# Patient Record
Sex: FEMALE | Race: ASIAN | Employment: UNEMPLOYED | URBAN - METROPOLITAN AREA
[De-identification: names, ages, dates, MRNs, and addresses within clinical notes are randomized per-mention and may not be internally consistent; named-entity substitution may affect disease eponyms.]

---

## 2017-02-13 ENCOUNTER — OFFICE VISIT (OUTPATIENT)
Dept: INTERNAL MEDICINE CLINIC | Facility: CLINIC | Age: 47
End: 2017-02-13

## 2017-02-13 VITALS
HEIGHT: 64 IN | DIASTOLIC BLOOD PRESSURE: 60 MMHG | BODY MASS INDEX: 21.68 KG/M2 | HEART RATE: 82 BPM | WEIGHT: 127 LBS | RESPIRATION RATE: 12 BRPM | OXYGEN SATURATION: 99 % | SYSTOLIC BLOOD PRESSURE: 110 MMHG

## 2017-02-13 DIAGNOSIS — I73.00 RAYNAUD'S PHENOMENON WITHOUT GANGRENE: ICD-10-CM

## 2017-02-13 DIAGNOSIS — R68.89 SENSATION OF FEELING COLD: ICD-10-CM

## 2017-02-13 DIAGNOSIS — S76.312A HAMSTRING STRAIN, LEFT, INITIAL ENCOUNTER: Primary | ICD-10-CM

## 2017-02-13 DIAGNOSIS — R68.83 SHIVERS: ICD-10-CM

## 2017-02-13 PROCEDURE — 99214 OFFICE O/P EST MOD 30 MIN: CPT | Performed by: INTERNAL MEDICINE

## 2017-02-13 NOTE — PATIENT INSTRUCTIONS
Supine Hamstring Stretch    1. Lie on the floor on your back, with both knees bent and your feet flat on the floor. Put a towel around the back of your right thigh. 2. Tighten your stomach muscles.  Slowly pull on the towel to pull your right leg toward

## 2017-02-13 NOTE — PROGRESS NOTES
Mari Stewart is a 42year old female.   HPI:   CC: shiver and cold sensation all over body ,left thigh pain for two months  Heavy periods monthly last 4 days with clots  Period came 7 days early this last time  Overstretched yoga 2 months ago when she noted josué LUNGS: clear to auscultation,   GI: Soft+BS NT ND,no masses, no HSM, no abdominal bruit , no hernia,   MUSCULOSKELETAL:  no cyanosis, clubbing or edema b/l, 2+DP/PT pulse bilateral, minimal trapezius knotted  No spinal or paraspinal tenderness  Neg SLR tania 7. Reach toward your ankle. Keep your knee, neck, and back straight. Feel the stretch in the back of your thigh. 8. Hold for 30 to 60 seconds. Repeat 2 times, or as instructed. 9. Switch legs and repeat.   10. Repeat this exercise 3 times per day, or as i

## 2017-02-15 LAB
ABSOLUTE BASOPHILS: 29 CELLS/UL (ref 0–200)
ABSOLUTE EOSINOPHILS: 29 CELLS/UL (ref 15–500)
ABSOLUTE LYMPHOCYTES: 1267 CELLS/UL (ref 850–3900)
ABSOLUTE MONOCYTES: 287 CELLS/UL (ref 200–950)
ABSOLUTE NEUTROPHILS: 2489 CELLS/UL (ref 1500–7800)
BASOPHILS: 0.7 %
EOSINOPHILS: 0.7 %
FERRITIN: 43 NG/ML (ref 10–232)
HEMATOCRIT: 35.9 % (ref 35–45)
HEMOGLOBIN: 12.1 G/DL (ref 11.7–15.5)
LYMPHOCYTES: 30.9 %
MCH: 31.8 PG (ref 27–33)
MCHC: 33.7 G/DL (ref 32–36)
MCV: 94.4 FL (ref 80–100)
MONOCYTES: 7 %
MPV: 9.4 FL (ref 7.5–12.5)
NEUTROPHILS: 60.7 %
PLATELET COUNT: 195 THOUSAND/UL (ref 140–400)
RDW: 13 % (ref 11–15)
RED BLOOD CELL COUNT: 3.81 MILLION/UL (ref 3.8–5.1)
TSH W/REFLEX TO FT4: 0.48 MIU/L
WHITE BLOOD CELL COUNT: 4.1 THOUSAND/UL (ref 3.8–10.8)

## 2017-06-21 ENCOUNTER — OFFICE VISIT (OUTPATIENT)
Dept: INTERNAL MEDICINE CLINIC | Facility: CLINIC | Age: 47
End: 2017-06-21

## 2017-06-21 VITALS
DIASTOLIC BLOOD PRESSURE: 60 MMHG | HEIGHT: 64 IN | WEIGHT: 122 LBS | BODY MASS INDEX: 20.83 KG/M2 | RESPIRATION RATE: 12 BRPM | SYSTOLIC BLOOD PRESSURE: 100 MMHG | TEMPERATURE: 98 F | HEART RATE: 70 BPM | OXYGEN SATURATION: 100 %

## 2017-06-21 DIAGNOSIS — Z01.419 PAP SMEAR, AS PART OF ROUTINE GYNECOLOGICAL EXAMINATION: ICD-10-CM

## 2017-06-21 DIAGNOSIS — Z00.00 ROUTINE PHYSICAL EXAMINATION: Primary | ICD-10-CM

## 2017-06-21 DIAGNOSIS — Z12.31 VISIT FOR SCREENING MAMMOGRAM: ICD-10-CM

## 2017-06-21 DIAGNOSIS — M25.561 CHRONIC PAIN OF RIGHT KNEE: ICD-10-CM

## 2017-06-21 DIAGNOSIS — G89.29 CHRONIC PAIN OF RIGHT KNEE: ICD-10-CM

## 2017-06-21 PROCEDURE — 87624 HPV HI-RISK TYP POOLED RSLT: CPT | Performed by: INTERNAL MEDICINE

## 2017-06-21 PROCEDURE — 99396 PREV VISIT EST AGE 40-64: CPT | Performed by: INTERNAL MEDICINE

## 2017-06-21 PROCEDURE — 88175 CYTOPATH C/V AUTO FLUID REDO: CPT | Performed by: INTERNAL MEDICINE

## 2017-06-21 NOTE — PROGRESS NOTES
HPI:   Mateus Nova is a 42year old female who presents for a complete physical exam.    Wt Readings from Last 6 Encounters:  06/21/17 : 122 lb  02/13/17 : 127 lb  10/03/16 : 120 lb  06/22/16 : 122 lb  07/28/15 : 122 lb  10/23/14 : 122 lb    Body mass index is pain or ST  LUNGS: denies shortness of breath with exertion  CARDIOVASCULAR: denies chest pain on exertion, no heart racing  GI: denies abdominal pain,denies heartburn  : denies dysuria  GYN- LMP June 3rd lighter flow  MUSCULOSKELETAL: denies back pain, Order put in for mammogram . Self breast exam explained. Health maintenance, will check fasting Lipids, CMP, and TSH. Pt' s weight is Body mass index is 20.93 kg/(m^2). , recommended low fat diet and aerobic exercise 30 minutes three times weekly.   The p

## 2017-07-14 PROBLEM — M17.11 PATELLOFEMORAL ARTHRITIS OF RIGHT KNEE: Status: ACTIVE | Noted: 2017-07-14

## 2017-08-25 ENCOUNTER — HOSPITAL ENCOUNTER (OUTPATIENT)
Dept: MAMMOGRAPHY | Age: 47
Discharge: HOME | End: 2017-08-25
Attending: INTERNAL MEDICINE
Payer: COMMERCIAL

## 2017-08-25 DIAGNOSIS — Z12.31 VISIT FOR SCREENING MAMMOGRAM: ICD-10-CM

## 2017-08-25 PROCEDURE — 77067 SCR MAMMO BI INCL CAD: CPT | Performed by: INTERNAL MEDICINE

## 2018-08-22 ENCOUNTER — TELEPHONE (OUTPATIENT)
Dept: INTERNAL MEDICINE CLINIC | Facility: CLINIC | Age: 48
End: 2018-08-22

## 2018-08-22 NOTE — TELEPHONE ENCOUNTER
Called pt, spoke to her . She is overdue for physical and pap. Offered to make ov with Dr Yamilet Young. He is going to research and call back and let us know.

## 2018-09-19 ENCOUNTER — OFFICE VISIT (OUTPATIENT)
Dept: INTERNAL MEDICINE CLINIC | Facility: CLINIC | Age: 48
End: 2018-09-19
Payer: COMMERCIAL

## 2018-09-19 VITALS
WEIGHT: 121 LBS | TEMPERATURE: 98 F | HEIGHT: 63.5 IN | BODY MASS INDEX: 21.17 KG/M2 | HEART RATE: 75 BPM | OXYGEN SATURATION: 98 % | RESPIRATION RATE: 16 BRPM | SYSTOLIC BLOOD PRESSURE: 98 MMHG | DIASTOLIC BLOOD PRESSURE: 60 MMHG

## 2018-09-19 DIAGNOSIS — Z23 NEED FOR VACCINATION: ICD-10-CM

## 2018-09-19 DIAGNOSIS — Z13.220 SCREENING FOR LIPOID DISORDERS: ICD-10-CM

## 2018-09-19 DIAGNOSIS — E55.9 VITAMIN D DEFICIENCY: ICD-10-CM

## 2018-09-19 DIAGNOSIS — Z12.39 BREAST CANCER SCREENING: ICD-10-CM

## 2018-09-19 DIAGNOSIS — Z13.29 SCREENING FOR THYROID DISORDER: ICD-10-CM

## 2018-09-19 DIAGNOSIS — Z13.1 SCREENING FOR DIABETES MELLITUS: ICD-10-CM

## 2018-09-19 DIAGNOSIS — Z00.00 ENCOUNTER FOR ANNUAL PHYSICAL EXAM: Primary | ICD-10-CM

## 2018-09-19 DIAGNOSIS — Z12.4 CERVICAL CANCER SCREENING: ICD-10-CM

## 2018-09-19 DIAGNOSIS — Z13.89 SCREENING FOR GENITOURINARY CONDITION: ICD-10-CM

## 2018-09-19 DIAGNOSIS — Z13.228 SCREENING FOR METABOLIC DISORDER: ICD-10-CM

## 2018-09-19 DIAGNOSIS — Z13.0 SCREENING FOR IRON DEFICIENCY ANEMIA: ICD-10-CM

## 2018-09-19 PROCEDURE — 99396 PREV VISIT EST AGE 40-64: CPT | Performed by: STUDENT IN AN ORGANIZED HEALTH CARE EDUCATION/TRAINING PROGRAM

## 2018-09-19 PROCEDURE — 90686 IIV4 VACC NO PRSV 0.5 ML IM: CPT | Performed by: STUDENT IN AN ORGANIZED HEALTH CARE EDUCATION/TRAINING PROGRAM

## 2018-09-19 PROCEDURE — 87624 HPV HI-RISK TYP POOLED RSLT: CPT | Performed by: STUDENT IN AN ORGANIZED HEALTH CARE EDUCATION/TRAINING PROGRAM

## 2018-09-19 PROCEDURE — 90471 IMMUNIZATION ADMIN: CPT | Performed by: STUDENT IN AN ORGANIZED HEALTH CARE EDUCATION/TRAINING PROGRAM

## 2018-09-19 NOTE — PROGRESS NOTES
[facility]    REASON FOR VISIT:    Juan Miguel Tirado is a 57year old female who presents for an [address]. Current Complaints: has intermittent joint pain, did not try anything for her symptoms. Otherwise feeling well.   Vaccinations: Elson Gosselin or other  risk factors No results found for: A1C  GLUCOSE (mg/dL)   Date Value   06/28/2017 92         Gonorrhea Screening if high risk No results found for: GONOCOCCUS    HIV Screening For all adults age 22-65, older adults at increased risk No results fo Genitourinary: Negative for urgency, frequency and difficulty urinating. Musculoskeletal: Positive for arthralgias, negative for gait problem. Skin: Negative for color change and rash. Neurological: Negative for tremors, weakness and numbness.    He Lymphadenopathy: She has no cervical adenopathy or supraclavicular adenopathy. Neurological: She is alert and oriented to person, place, and time. She has normal reflexes. Skin: Skin is warm. No rash noted. No erythema.    Psychiatric: She has a torres PLATELET    Screening for thyroid disorder  -     TSH W REFLEX TO FREE T4    Screening for diabetes mellitus  -     HEMOGLOBIN A1C    Cervical cancer screening  -     THINPREP PAP WITH HPV REFLEX REQUEST B; Future  -     THINPREP PAP WITH HPV REFLEX REQUES dose   Varicella 2 doses if not immune   MMR 1-2 doses if born after 1954 and not Afshan Willis MD  9/19/2018  4:14 PM

## 2018-09-19 NOTE — PATIENT INSTRUCTIONS
Prevention Guidelines, Women Ages 36 to 52  Screening tests and vaccines are an important part of managing your health. A screening test is done to find possible disorders or diseases in people who don't have any symptoms.  The goal is to find a disease e Depression All women in this age group At routine exams   Gonorrhea Sexually active women at increased risk for infection At routine exams   Hepatitis C Anyone at increased risk; 1 time for those born between [facility] At routine exams   High cholester Meningococcal Women at increased risk for infection–talk with your healthcare provider 1 or more doses   Pneumococcal conjugate vaccine (PCV13) and pneumococcal polysaccharide vaccine (PPSV23) Women at increased risk for infection–talk with your healthcare

## 2018-09-23 LAB — HPV I/H RISK 1 DNA SPEC QL NAA+PROBE: NEGATIVE

## 2018-09-26 LAB
ABSOLUTE BASOPHILS: 38 CELLS/UL (ref 0–200)
ABSOLUTE EOSINOPHILS: 22 CELLS/UL (ref 15–500)
ABSOLUTE LYMPHOCYTES: 1598 CELLS/UL (ref 850–3900)
ABSOLUTE MONOCYTES: 297 CELLS/UL (ref 200–950)
ABSOLUTE NEUTROPHILS: 3445 CELLS/UL (ref 1500–7800)
ALBUMIN/GLOBULIN RATIO: 1.8 (CALC) (ref 1–2.5)
ALBUMIN: 4.4 G/DL (ref 3.6–5.1)
ALKALINE PHOSPHATASE: 35 U/L (ref 33–115)
ALT: 12 U/L (ref 6–29)
APPEARANCE: CLEAR
AST: 14 U/L (ref 10–35)
BASOPHILS: 0.7 %
BILIRUBIN, TOTAL: 0.9 MG/DL (ref 0.2–1.2)
BILIRUBIN: NEGATIVE
BUN: 7 MG/DL (ref 7–25)
CALCIUM: 9 MG/DL (ref 8.6–10.2)
CARBON DIOXIDE: 26 MMOL/L (ref 20–32)
CHLORIDE: 104 MMOL/L (ref 98–110)
CHOL/HDLC RATIO: 3 (CALC)
CHOLESTEROL, TOTAL: 169 MG/DL
COLOR: YELLOW
CREATININE: 0.7 MG/DL (ref 0.5–1.1)
EGFR IF AFRICN AM: 120 ML/MIN/1.73M2
EGFR IF NONAFRICN AM: 104 ML/MIN/1.73M2
EOSINOPHILS: 0.4 %
GLOBULIN: 2.5 G/DL (CALC) (ref 1.9–3.7)
GLUCOSE: 84 MG/DL (ref 65–99)
GLUCOSE: NEGATIVE
HDL CHOLESTEROL: 57 MG/DL
HEMATOCRIT: 35.4 % (ref 35–45)
HEMOGLOBIN A1C: 5.1 % OF TOTAL HGB
HEMOGLOBIN: 11.8 G/DL (ref 11.7–15.5)
KETONES: NEGATIVE
LDL-CHOLESTEROL: 94 MG/DL (CALC)
LYMPHOCYTES: 29.6 %
MCH: 30.6 PG (ref 27–33)
MCHC: 33.3 G/DL (ref 32–36)
MCV: 91.9 FL (ref 80–100)
MONOCYTES: 5.5 %
MPV: 10.9 FL (ref 7.5–12.5)
NEUTROPHILS: 63.8 %
NITRITE: NEGATIVE
NON-HDL CHOLESTEROL: 112 MG/DL (CALC)
OCCULT BLOOD: NEGATIVE
PH: 7.5 (ref 5–8)
PLATELET COUNT: 218 THOUSAND/UL (ref 140–400)
POTASSIUM: 3.9 MMOL/L (ref 3.5–5.3)
PROTEIN, TOTAL: 6.9 G/DL (ref 6.1–8.1)
PROTEIN: NEGATIVE
RDW: 12 % (ref 11–15)
RED BLOOD CELL COUNT: 3.85 MILLION/UL (ref 3.8–5.1)
SODIUM: 136 MMOL/L (ref 135–146)
SPECIFIC GRAVITY: 1.01 (ref 1–1.03)
TRIGLYCERIDES: 90 MG/DL
TSH W/REFLEX TO FT4: 0.85 MIU/L
VITAMIN D, 25-OH, TOTAL: 35 NG/ML (ref 30–100)
WHITE BLOOD CELL COUNT: 5.4 THOUSAND/UL (ref 3.8–10.8)

## 2019-04-09 ENCOUNTER — HOSPITAL ENCOUNTER (OUTPATIENT)
Age: 49
Discharge: HOME | End: 2019-04-09
Attending: FAMILY MEDICINE
Payer: COMMERCIAL

## 2019-04-09 ENCOUNTER — TELEPHONE (OUTPATIENT)
Dept: INTERNAL MEDICINE CLINIC | Facility: CLINIC | Age: 49
End: 2019-04-09

## 2019-04-09 ENCOUNTER — APPOINTMENT (OUTPATIENT)
Dept: GENERAL RADIOLOGY | Age: 49
End: 2019-04-09
Attending: FAMILY MEDICINE
Payer: COMMERCIAL

## 2019-04-09 VITALS
SYSTOLIC BLOOD PRESSURE: 104 MMHG | OXYGEN SATURATION: 99 % | TEMPERATURE: 98 F | BODY MASS INDEX: 20.49 KG/M2 | DIASTOLIC BLOOD PRESSURE: 69 MMHG | HEIGHT: 64 IN | HEART RATE: 58 BPM | WEIGHT: 120 LBS | RESPIRATION RATE: 18 BRPM

## 2019-04-09 DIAGNOSIS — J39.2 THROAT IRRITATION: Primary | ICD-10-CM

## 2019-04-09 PROCEDURE — 70360 X-RAY EXAM OF NECK: CPT | Performed by: FAMILY MEDICINE

## 2019-04-09 PROCEDURE — 99213 OFFICE O/P EST LOW 20 MIN: CPT

## 2019-04-09 PROCEDURE — 99204 OFFICE O/P NEW MOD 45 MIN: CPT

## 2019-04-09 NOTE — ED INITIAL ASSESSMENT (HPI)
The patient states she has had a discomfort to her throat/esophagus x 4 months. She states the discomfort is intermittent and started at random.   She feels better when she drinks something but the discomfort gets worse if she wears a shirt with a high col

## 2019-04-09 NOTE — TELEPHONE ENCOUNTER
Patients  calling stating that for the last couple of months the patient has felt like she has something stuck in her throat.  is translating for wife. Patient states that it does not usually affect her eating, drinking or breathing.  Patient

## 2019-04-09 NOTE — ED PROVIDER NOTES
Patient Seen in: [facility]    History   Patient presents with:  Sore Throat    Stated Complaint: check throat, x3-4months    HPI    61-year-old female with a history of chronic sinusitis/allergic rhinitis presents the IC s and vital signs reviewed. All other systems reviewed and negative except as noted above.     Physical Exam     ED Triage Vitals [04/09/19 1540]   /56   Pulse 68   Resp 18   Temp 98.4 °F (36.9 °C)   Temp src Temporal   SpO2 97 %   O2 Device None ( lidocaine for symptom medic relief until she can follow-up with her PCP for further evaluation and treatment.             Disposition and Plan     Clinical Impression:  Throat irritation  (primary encounter diagnosis)    Disposition:  Discharge  4/9/2019  5

## 2019-04-10 ENCOUNTER — TELEPHONE (OUTPATIENT)
Dept: INTERNAL MEDICINE CLINIC | Facility: CLINIC | Age: 49
End: 2019-04-10

## 2019-04-10 NOTE — TELEPHONE ENCOUNTER
Pt went to UC yesterday for worsening chronic throat irritation. Would you prefer she follow up with you or see ENT first?    MDM   73-year-old female with 4 months of throat irritation. Patient has no dysphasia. No signs of any thyroid etiology.   Soft t

## 2019-04-25 ENCOUNTER — MED REC SCAN ONLY (OUTPATIENT)
Dept: INTERNAL MEDICINE CLINIC | Facility: CLINIC | Age: 49
End: 2019-04-25

## 2021-03-28 ENCOUNTER — IMMUNIZATION (OUTPATIENT)
Dept: LAB | Age: 51
End: 2021-03-28

## 2021-03-28 DIAGNOSIS — Z23 NEED FOR VACCINATION: Primary | ICD-10-CM

## 2021-03-28 PROCEDURE — 91300 COVID 19 PFIZER-BIONTECH: CPT

## 2021-03-28 PROCEDURE — 0001A COVID 19 PFIZER-BIONTECH: CPT

## 2021-04-18 ENCOUNTER — IMMUNIZATION (OUTPATIENT)
Dept: LAB | Age: 51
End: 2021-04-18
Attending: HOSPITALIST

## 2021-04-18 DIAGNOSIS — Z23 NEED FOR VACCINATION: Primary | ICD-10-CM

## 2021-04-18 PROCEDURE — 0002A COVID 19 PFIZER-BIONTECH: CPT

## 2021-04-18 PROCEDURE — 91300 COVID 19 PFIZER-BIONTECH: CPT

## 2022-08-08 ENCOUNTER — TELEPHONE (OUTPATIENT)
Dept: FAMILY MEDICINE CLINIC | Facility: CLINIC | Age: 52
End: 2022-08-08

## 2022-08-08 NOTE — TELEPHONE ENCOUNTER
Pt with complaints of right lower arm rash that started 08/06/22. At first it appeared as a mosquito bite and now is  Spreading  States she notes 8-9 red,swollen spots and one with clear discharge. States they are very itchy. Pt also with complaints of itchy throat, denies cough or fever.      Pt to establish care for exam of this rash, please advice if ok to schedule virtual apt or if pt should be referred to [facility]

## 2022-08-09 ENCOUNTER — HOSPITAL ENCOUNTER (OUTPATIENT)
Age: 52
Discharge: HOME | End: 2022-08-09
Payer: COMMERCIAL

## 2022-08-09 VITALS
HEART RATE: 89 BPM | RESPIRATION RATE: 20 BRPM | WEIGHT: 120 LBS | BODY MASS INDEX: 20.49 KG/M2 | SYSTOLIC BLOOD PRESSURE: 113 MMHG | TEMPERATURE: 99 F | HEIGHT: 64 IN | DIASTOLIC BLOOD PRESSURE: 73 MMHG | OXYGEN SATURATION: 97 %

## 2022-08-09 DIAGNOSIS — R21 RASH: Primary | ICD-10-CM

## 2022-08-09 DIAGNOSIS — U07.1 COVID-19 VIRUS INFECTION: ICD-10-CM

## 2022-08-09 LAB — SARS-COV-2 RNA RESP QL NAA+PROBE: DETECTED

## 2022-08-09 RX ORDER — TRIAMCINOLONE ACETONIDE 1 MG/G
CREAM TOPICAL 2 TIMES DAILY
Qty: 1 EACH | Refills: 0 | Status: SHIPPED | OUTPATIENT
Start: 2022-08-09 | End: 2022-08-19

## 2022-08-09 RX ORDER — NIRMATRELVIR AND RITONAVIR 300-100 MG
KIT ORAL
Qty: 30 TABLET | Refills: 0 | Status: SHIPPED | OUTPATIENT
Start: 2022-08-09 | End: 2022-08-14

## 2022-08-09 NOTE — ED INITIAL ASSESSMENT (HPI)
Pt has had a cough , headache and a rash to her arms and legs that is very itchy for the past 3 days  [place] and  also has a rash.   At home covid test neg

## 2022-08-10 ENCOUNTER — TELEMEDICINE (OUTPATIENT)
Dept: FAMILY MEDICINE CLINIC | Facility: CLINIC | Age: 52
End: 2022-08-10
Payer: COMMERCIAL

## 2022-08-10 DIAGNOSIS — U07.1 COVID-19 VIRUS INFECTION: Primary | ICD-10-CM

## 2022-08-10 DIAGNOSIS — R21 RASH AND NONSPECIFIC SKIN ERUPTION: ICD-10-CM

## 2022-08-10 PROCEDURE — 99203 OFFICE O/P NEW LOW 30 MIN: CPT | Performed by: FAMILY MEDICINE

## 2022-08-12 LAB
MONKEYPOX PCR: NOT DETECTED
MONKEYPOX PCR: NOT DETECTED

## 2023-02-10 ENCOUNTER — OFFICE VISIT (OUTPATIENT)
Dept: FAMILY MEDICINE CLINIC | Facility: CLINIC | Age: 53
End: 2023-02-10
Payer: COMMERCIAL

## 2023-02-10 VITALS
BODY MASS INDEX: 21.46 KG/M2 | TEMPERATURE: 98 F | SYSTOLIC BLOOD PRESSURE: 108 MMHG | RESPIRATION RATE: 18 BRPM | HEART RATE: 83 BPM | HEIGHT: 63.5 IN | DIASTOLIC BLOOD PRESSURE: 62 MMHG | WEIGHT: 122.63 LBS | OXYGEN SATURATION: 99 %

## 2023-02-10 DIAGNOSIS — Z12.31 ENCOUNTER FOR MAMMOGRAM TO ESTABLISH BASELINE MAMMOGRAM: ICD-10-CM

## 2023-02-10 DIAGNOSIS — Z13.0 SCREENING FOR ENDOCRINE, NUTRITIONAL, METABOLIC AND IMMUNITY DISORDER: ICD-10-CM

## 2023-02-10 DIAGNOSIS — L71.0 PERIORAL DERMATITIS: ICD-10-CM

## 2023-02-10 DIAGNOSIS — Z13.6 SCREENING FOR ISCHEMIC HEART DISEASE: ICD-10-CM

## 2023-02-10 DIAGNOSIS — Z12.4 SCREENING FOR CERVICAL CANCER: ICD-10-CM

## 2023-02-10 DIAGNOSIS — Z12.11 COLON CANCER SCREENING: ICD-10-CM

## 2023-02-10 DIAGNOSIS — Z13.21 SCREENING FOR ENDOCRINE, NUTRITIONAL, METABOLIC AND IMMUNITY DISORDER: ICD-10-CM

## 2023-02-10 DIAGNOSIS — Z23 NEED FOR VACCINATION: ICD-10-CM

## 2023-02-10 DIAGNOSIS — Z13.228 SCREENING FOR ENDOCRINE, NUTRITIONAL, METABOLIC AND IMMUNITY DISORDER: ICD-10-CM

## 2023-02-10 DIAGNOSIS — Z00.00 ANNUAL PHYSICAL EXAM: Primary | ICD-10-CM

## 2023-02-10 DIAGNOSIS — Z13.29 SCREENING FOR ENDOCRINE, NUTRITIONAL, METABOLIC AND IMMUNITY DISORDER: ICD-10-CM

## 2023-02-10 PROCEDURE — 3008F BODY MASS INDEX DOCD: CPT | Performed by: FAMILY MEDICINE

## 2023-02-10 PROCEDURE — 99396 PREV VISIT EST AGE 40-64: CPT | Performed by: FAMILY MEDICINE

## 2023-02-10 PROCEDURE — 88175 CYTOPATH C/V AUTO FLUID REDO: CPT | Performed by: FAMILY MEDICINE

## 2023-02-10 PROCEDURE — 3078F DIAST BP <80 MM HG: CPT | Performed by: FAMILY MEDICINE

## 2023-02-10 PROCEDURE — 90471 IMMUNIZATION ADMIN: CPT | Performed by: FAMILY MEDICINE

## 2023-02-10 PROCEDURE — 87624 HPV HI-RISK TYP POOLED RSLT: CPT | Performed by: FAMILY MEDICINE

## 2023-02-10 PROCEDURE — 90472 IMMUNIZATION ADMIN EACH ADD: CPT | Performed by: FAMILY MEDICINE

## 2023-02-10 PROCEDURE — 90715 TDAP VACCINE 7 YRS/> IM: CPT | Performed by: FAMILY MEDICINE

## 2023-02-10 PROCEDURE — 90750 HZV VACC RECOMBINANT IM: CPT | Performed by: FAMILY MEDICINE

## 2023-02-10 PROCEDURE — 3074F SYST BP LT 130 MM HG: CPT | Performed by: FAMILY MEDICINE

## 2023-02-13 LAB — HPV I/H RISK 1 DNA SPEC QL NAA+PROBE: NEGATIVE

## 2023-02-15 PROCEDURE — 82274 ASSAY TEST FOR BLOOD FECAL: CPT | Performed by: FAMILY MEDICINE

## 2023-02-17 ENCOUNTER — TELEPHONE (OUTPATIENT)
Dept: FAMILY MEDICINE CLINIC | Facility: CLINIC | Age: 53
End: 2023-02-17

## 2023-02-17 DIAGNOSIS — L71.0 PERIORAL DERMATITIS: Primary | ICD-10-CM

## 2023-02-17 NOTE — TELEPHONE ENCOUNTER
Patients [facility] called requesting Dermatology referral due to patient having worsening rash around mouth. Requested Mario Emerson MD, FAAD with [facility]. Referral pended for review.

## 2023-02-17 NOTE — TELEPHONE ENCOUNTER
Pt's  informed that referral has been signed, but it is still open in the system. Asked  to call back on Monday if he is unable to see the referral in the chart and we can reach out to the referral department.

## 2023-02-22 LAB
ABSOLUTE BASOPHILS: 62 CELLS/UL (ref 0–200)
ABSOLUTE EOSINOPHILS: 149 CELLS/UL (ref 15–500)
ABSOLUTE LYMPHOCYTES: 1445 CELLS/UL (ref 850–3900)
ABSOLUTE MONOCYTES: 397 CELLS/UL (ref 200–950)
ABSOLUTE NEUTROPHILS: 4148 CELLS/UL (ref 1500–7800)
ALBUMIN/GLOBULIN RATIO: 2.2 (CALC) (ref 1–2.5)
ALBUMIN: 4.3 G/DL (ref 3.6–5.1)
ALKALINE PHOSPHATASE: 31 U/L (ref 37–153)
ALT: 12 U/L (ref 6–29)
AST: 12 U/L (ref 10–35)
BASOPHILS: 1 %
BILIRUBIN, TOTAL: 0.6 MG/DL (ref 0.2–1.2)
BUN: 10 MG/DL (ref 7–25)
CALCIUM: 8.9 MG/DL (ref 8.6–10.4)
CARBON DIOXIDE: 27 MMOL/L (ref 20–32)
CHLORIDE: 106 MMOL/L (ref 98–110)
CHOL/HDLC RATIO: 3 (CALC)
CHOLESTEROL, TOTAL: 192 MG/DL
CREATININE: 0.68 MG/DL (ref 0.5–1.03)
EGFR: 106 ML/MIN/1.73M2
EOSINOPHILS: 2.4 %
GLOBULIN: 2 G/DL (CALC) (ref 1.9–3.7)
GLUCOSE: 85 MG/DL (ref 65–99)
HDL CHOLESTEROL: 65 MG/DL
HEMATOCRIT: 36.4 % (ref 35–45)
HEMOGLOBIN: 12.1 G/DL (ref 11.7–15.5)
LDL-CHOLESTEROL: 110 MG/DL (CALC)
LYMPHOCYTES: 23.3 %
MCH: 31.6 PG (ref 27–33)
MCHC: 33.2 G/DL (ref 32–36)
MCV: 95 FL (ref 80–100)
MONOCYTES: 6.4 %
MPV: 10.7 FL (ref 7.5–12.5)
NEUTROPHILS: 66.9 %
NON-HDL CHOLESTEROL: 127 MG/DL (CALC)
PLATELET COUNT: 237 THOUSAND/UL (ref 140–400)
POTASSIUM: 4.1 MMOL/L (ref 3.5–5.3)
PROTEIN, TOTAL: 6.3 G/DL (ref 6.1–8.1)
RDW: 11.8 % (ref 11–15)
RED BLOOD CELL COUNT: 3.83 MILLION/UL (ref 3.8–5.1)
SODIUM: 138 MMOL/L (ref 135–146)
TRIGLYCERIDES: 76 MG/DL
TSH W/REFLEX TO FT4: 1.33 MIU/L
WHITE BLOOD CELL COUNT: 6.2 THOUSAND/UL (ref 3.8–10.8)

## 2023-02-27 LAB — HEMOCCULT STL QL: NEGATIVE

## 2023-04-11 ENCOUNTER — NURSE ONLY (OUTPATIENT)
Dept: FAMILY MEDICINE CLINIC | Facility: CLINIC | Age: 53
End: 2023-04-11
Payer: COMMERCIAL

## 2023-04-11 DIAGNOSIS — Z23 NEED FOR VACCINATION: Primary | ICD-10-CM

## 2023-04-11 PROCEDURE — 90750 HZV VACC RECOMBINANT IM: CPT | Performed by: FAMILY MEDICINE

## 2023-04-11 PROCEDURE — 90471 IMMUNIZATION ADMIN: CPT | Performed by: FAMILY MEDICINE

## 2023-04-11 RX ORDER — DOXYCYCLINE 100 MG/1
CAPSULE ORAL
COMMUNITY
Start: 2023-03-08

## 2023-04-11 RX ORDER — SULFACETAMIDE SODIUM, SULFUR 98; 48 MG/G; MG/G
CREAM TOPICAL
COMMUNITY
Start: 2023-03-08

## 2023-07-13 ENCOUNTER — PATIENT MESSAGE (OUTPATIENT)
Dept: FAMILY MEDICINE CLINIC | Facility: CLINIC | Age: 53
End: 2023-07-13

## 2023-07-13 DIAGNOSIS — D05.11 DUCTAL CARCINOMA IN SITU (DCIS) OF RIGHT BREAST: Primary | ICD-10-CM

## 2023-07-13 NOTE — TELEPHONE ENCOUNTER
Spouse called, wanted to know if we had received pt My Chart Message. Also wants to know if Dr. Charly Daugherty will be referring pt to oncologist or breast cancer specialist. Was informed that we would give him a call back when referral was signed. Pt v/u.

## 2023-07-13 NOTE — TELEPHONE ENCOUNTER
From: Mary Cordova  To: Kelvin Mtz MD  Sent: 7/13/2023 8:58 AM CDT  Subject: Referral request for an oncologist    Dr. Kelvin Mtz,     I am diagnosed with Invasive Ductal Carcinoma (IDC) in a [facility] facility in my right breast. The tumor was surgically removed along with 3 nearby lymph nodes on July 5, 2023 and biopsy was performed. Please refer to the attached pathology reports (in [facility]). I am returning to the [address] [facility] on July 24 but would like to contact an oncologist immediately so I can visit her/him once I am back to the [address] [facility]. My , Danny Grissom, is in the [address] [facility]. Please release any information/messages to him. His cell # is [phone] or [email or web address]. Besides, my  will call you/your office later today or tomorrow morning regarding this diagnosis. Your urgent attention is greatly appreciated.     Mary Cordova

## 2023-07-13 NOTE — TELEPHONE ENCOUNTER
Please see My Chart Message and review. Pathology reports printed and placed in provider's bin for review. Oncology referral pended for provider review and signature.

## 2023-07-14 ENCOUNTER — NURSE NAVIGATOR ENCOUNTER (OUTPATIENT)
Dept: HEMATOLOGY/ONCOLOGY | Facility: HOSPITAL | Age: 53
End: 2023-07-14

## 2023-07-14 NOTE — PROGRESS NOTES
Called patient's  back in regards to his VM about helping patient receive a medical oncologist appointment after her breast conservation surgery she had in [place] on July 5, 2023 with results of invasive ductal carcinoma with ER +, Her-2(-), Ki-67 10%, and patient's  stated that patient's tumor was about 7 x 6 mm. I introduced myself as one of the breast nurse navigators and stated that we will need patient's breast images for the last 5 years on a disc and her medical records leading to her breast cancer diagnosis. Placed on hold for an appointment for Dr. Orlando Joshi for Tuesday July 25, 2023 in [place] for patient and stated that I will contact him next in regards to him obtaining the disc and records. He stated he will do his best since most of the records are in [place]. He states his wife returns from [place] on Monday July 24, 2023. He thanked me for the phone call back and assistance. Pt was provided with the breast nurse navigators contact information and was encouraged to phone with any other questions or concerns.

## 2023-07-17 ENCOUNTER — NURSE NAVIGATOR ENCOUNTER (OUTPATIENT)
Dept: HEMATOLOGY/ONCOLOGY | Facility: HOSPITAL | Age: 53
End: 2023-07-17

## 2023-07-17 NOTE — PROGRESS NOTES
LMOVMTBENNETT in regards to his message from [place] to call him. Awaiting phone call back from patient's .

## 2023-07-18 ENCOUNTER — NURSE NAVIGATOR ENCOUNTER (OUTPATIENT)
Dept: HEMATOLOGY/ONCOLOGY | Facility: HOSPITAL | Age: 53
End: 2023-07-18

## 2023-07-18 ENCOUNTER — TELEPHONE (OUTPATIENT)
Dept: FAMILY MEDICINE CLINIC | Facility: CLINIC | Age: 53
End: 2023-07-18

## 2023-07-18 NOTE — TELEPHONE ENCOUNTER
Pt's  informed that both referrals has been authorized and he should call the doctor's office to make sure that they can see the referrals, he v/u.

## 2023-07-18 NOTE — PROGRESS NOTES
Called patient's spouse, Ophelia Duncan, and stated to him that we received patient's breast images from [facility]. I stated to him that I saw the images he sent to the patient's PCP via [employer] but I still need the images on the disc. He stated he can drop off the images on Thursday or Friday afternoon. I also asked him if he was able to ask his wife in [place] if she is able to obtain her surgical pathology slides/blocks from her lumpectomy and he stated he will ask her. I stated to him that his wife's referral for Dr. Angela Schreiber was authorized and scheduled his wife for Tuesday July 25, 2023 at 1000 at the [facility]. He thanked me for the phone call and assistance. Pt was provided with the breast nurse navigators contact information and was encouraged to phone with any other questions or concerns.

## 2023-07-18 NOTE — TELEPHONE ENCOUNTER
Pt's  called asking for update on referrals that were written on 7/13/2023 to surgical oncology and oncology. Referrals are still pending. [employer] message sent to Pauline Valenzuela for update: \"Barrydavida Salgado. I have a patient who is requesting an update regarding their 2 referral: #[id number] and #[id number]. The patient's [id number]. Let me know.  Thank you\"

## 2023-07-20 ENCOUNTER — NURSE NAVIGATOR ENCOUNTER (OUTPATIENT)
Dept: HEMATOLOGY/ONCOLOGY | Facility: HOSPITAL | Age: 53
End: 2023-07-20

## 2023-07-20 NOTE — PROGRESS NOTES
Received flash drive of patient's breast images from [place] and Oncotype results from patient's spouse, David Denton ([facility]). Brought patient's images to radiology department. Oncotype results were in [facility] and patient's  stated he will get the translation in English results too to the breast nurse navigators. He inquired the appointment with Dr. Sylvia Chahal and I stated that I will request an appointment today with Dr. Debi John team. He thanked me for the assistance. Patient is scheduled with Dr. Rosalie Garcia on Tuesday July 25, 2023 at the [facility].

## 2023-07-25 ENCOUNTER — PATIENT MESSAGE (OUTPATIENT)
Dept: FAMILY MEDICINE CLINIC | Facility: CLINIC | Age: 53
End: 2023-07-25

## 2023-07-25 ENCOUNTER — TELEPHONE (OUTPATIENT)
Dept: HEMATOLOGY/ONCOLOGY | Facility: HOSPITAL | Age: 53
End: 2023-07-25

## 2023-07-25 ENCOUNTER — HOSPITAL ENCOUNTER (OUTPATIENT)
Dept: GENERAL RADIOLOGY | Facility: HOSPITAL | Age: 53
Discharge: HOME | End: 2023-07-25
Attending: INTERNAL MEDICINE
Payer: COMMERCIAL

## 2023-07-25 ENCOUNTER — OFFICE VISIT (OUTPATIENT)
Dept: HEMATOLOGY/ONCOLOGY | Facility: HOSPITAL | Age: 53
End: 2023-07-25
Attending: INTERNAL MEDICINE
Payer: COMMERCIAL

## 2023-07-25 ENCOUNTER — LAB ENCOUNTER (OUTPATIENT)
Dept: LAB | Facility: HOSPITAL | Age: 53
End: 2023-07-25
Attending: INTERNAL MEDICINE
Payer: COMMERCIAL

## 2023-07-25 ENCOUNTER — NURSE NAVIGATOR ENCOUNTER (OUTPATIENT)
Dept: HEMATOLOGY/ONCOLOGY | Facility: HOSPITAL | Age: 53
End: 2023-07-25

## 2023-07-25 VITALS
TEMPERATURE: 98 F | HEART RATE: 74 BPM | HEIGHT: 63.5 IN | WEIGHT: 123 LBS | OXYGEN SATURATION: 99 % | RESPIRATION RATE: 16 BRPM | DIASTOLIC BLOOD PRESSURE: 80 MMHG | BODY MASS INDEX: 21.52 KG/M2 | SYSTOLIC BLOOD PRESSURE: 131 MMHG

## 2023-07-25 DIAGNOSIS — D05.11 DUCTAL CARCINOMA IN SITU (DCIS) OF RIGHT BREAST: Primary | ICD-10-CM

## 2023-07-25 DIAGNOSIS — Z17.0 MALIGNANT NEOPLASM OF UPPER-OUTER QUADRANT OF RIGHT BREAST IN FEMALE, ESTROGEN RECEPTOR POSITIVE: Primary | ICD-10-CM

## 2023-07-25 DIAGNOSIS — R05.3 CHRONIC COUGH: ICD-10-CM

## 2023-07-25 DIAGNOSIS — C50.411 MALIGNANT NEOPLASM OF UPPER-OUTER QUADRANT OF RIGHT BREAST IN FEMALE, ESTROGEN RECEPTOR POSITIVE: ICD-10-CM

## 2023-07-25 DIAGNOSIS — C50.411 MALIGNANT NEOPLASM OF UPPER-OUTER QUADRANT OF RIGHT BREAST IN FEMALE, ESTROGEN RECEPTOR POSITIVE: Primary | ICD-10-CM

## 2023-07-25 DIAGNOSIS — Z17.0 MALIGNANT NEOPLASM OF UPPER-OUTER QUADRANT OF RIGHT BREAST IN FEMALE, ESTROGEN RECEPTOR POSITIVE: ICD-10-CM

## 2023-07-25 PROCEDURE — 71046 X-RAY EXAM CHEST 2 VIEWS: CPT | Performed by: INTERNAL MEDICINE

## 2023-07-25 PROCEDURE — 99205 OFFICE O/P NEW HI 60 MIN: CPT | Performed by: INTERNAL MEDICINE

## 2023-07-25 RX ORDER — TAMOXIFEN CITRATE 20 MG/1
20 TABLET ORAL DAILY
Qty: 90 TABLET | Refills: 3 | Status: SHIPPED | OUTPATIENT
Start: 2023-07-25

## 2023-07-25 NOTE — TELEPHONE ENCOUNTER
Left voicemail for patient to call back, offering appointment with Dr. Miranda Bennett for 6 months from now.

## 2023-07-25 NOTE — TELEPHONE ENCOUNTER
From: Melissa Vinson  To: Alexandro Deshpande MD  Sent: 7/25/2023 2:37 PM CDT  Subject: Referral needed for Radiation Oncologist    Dear Dr. Audie Anderson,    I was seen by Dr. Jaimie Gtz this morning and was advised to see Radiation Oncologist for consultation. Please submit a referral for me to see a radiation oncologist at [facility].     Best regards,    Melissa Vinson

## 2023-07-25 NOTE — PROGRESS NOTES
Patient is here for consultation for breast cancer. She had routine mammogram and irregularities were found in both breasts. She had biopsies and the right breast showed cancer. She had a lumpectomy and 3 lymph nodes removed on 7/5 while in [place]. Reports have been obtained with translation to [place]. She had oncotype testing with a score of 6. She is feeling well after surgery. She has some numbness remaining in the axilla. She has had some trouble sleeping recently with her travel back to the [address] [place] [address]. Overall her health is good. She is not currently taking any medications. She eats well and is active. She is here to discuss risks and plan of care. She has not yet scheduled to see Dr. Irma Jenkins for follow up.       Education Record    Learner:  Patient and Spouse    Disease / Diagnosis: breast cancer    Barriers / Limitations:  None   Comments:    Method:  Discussion   Comments:    General Topics:  Plan of care reviewed   Comments:    Outcome:  Shows understanding   Comments:

## 2023-07-25 NOTE — PROGRESS NOTES
Met with patient and her  during Dr. Magui Nicole clinic. Discussed Dr. Magui Nicole recommendations for radiation treatment. I stated that the radiation department will call her to set up an appointment. I stated that I am still awaiting for an appointment in 6 months from Dr. Robert Mcarthur team and will notify them once one is given. They thanked me for the assistance.

## 2023-07-31 NOTE — TELEPHONE ENCOUNTER
Per referral department, Dr. Garfield Severino is out of network. Spoke to [facility] and was informed that Dr. Ralph Pagan (radiation oncologist) is in-network. Referral pended for provider review and signature. Note:  ----- Message -----  From: Yuliya Villanueva  Sent: 7/31/2023  10:45 AM CDT  To: [facility] [place] Clinical Staff  Subject: Ref# [id number] - Referred to provider [address] [place] [address],       Please note that the requested provider the patient is being referred to is NOT an In-Network Provider. Since the patient has an [place], please have the patient contact Critical access hospital for In-Network options. This referral will be closed.         Thank you,  [facility]  Referral Specialist

## 2023-08-01 ENCOUNTER — TELEPHONE (OUTPATIENT)
Dept: RADIATION ONCOLOGY | Facility: HOSPITAL | Age: 53
End: 2023-08-01

## 2023-08-08 ENCOUNTER — TELEPHONE (OUTPATIENT)
Dept: FAMILY MEDICINE CLINIC | Facility: CLINIC | Age: 53
End: 2023-08-08

## 2023-08-08 NOTE — TELEPHONE ENCOUNTER
Kristie Kirkpatrick called from [place], [facility] they are seeing pt for breast cancer treatment. Pt is new to [facility] and Kristie Kirkpatrick asked for any imaging or OV notes regarding breast cancer. Imaging and OV notes printed and faxed to Kristie Kirkpatrick at [phone]. Confirmation received.

## 2023-08-09 ENCOUNTER — APPOINTMENT (OUTPATIENT)
Dept: RADIATION ONCOLOGY | Facility: HOSPITAL | Age: 53
End: 2023-08-09
Attending: RADIOLOGY
Payer: COMMERCIAL

## 2023-11-01 ENCOUNTER — OFFICE VISIT (OUTPATIENT)
Dept: FAMILY MEDICINE CLINIC | Facility: CLINIC | Age: 53
End: 2023-11-01
Payer: COMMERCIAL

## 2023-11-01 VITALS
WEIGHT: 128.81 LBS | DIASTOLIC BLOOD PRESSURE: 64 MMHG | HEART RATE: 78 BPM | BODY MASS INDEX: 22 KG/M2 | RESPIRATION RATE: 18 BRPM | SYSTOLIC BLOOD PRESSURE: 100 MMHG | TEMPERATURE: 97 F | OXYGEN SATURATION: 97 %

## 2023-11-01 DIAGNOSIS — Z23 NEED FOR VACCINATION: ICD-10-CM

## 2023-11-01 DIAGNOSIS — R42 DIZZINESS: ICD-10-CM

## 2023-11-01 DIAGNOSIS — R00.2 PALPITATIONS: ICD-10-CM

## 2023-11-01 DIAGNOSIS — D05.11 DUCTAL CARCINOMA IN SITU (DCIS) OF RIGHT BREAST: Primary | ICD-10-CM

## 2023-11-01 PROCEDURE — 99214 OFFICE O/P EST MOD 30 MIN: CPT | Performed by: FAMILY MEDICINE

## 2023-11-01 PROCEDURE — 90471 IMMUNIZATION ADMIN: CPT | Performed by: FAMILY MEDICINE

## 2023-11-01 PROCEDURE — 90686 IIV4 VACC NO PRSV 0.5 ML IM: CPT | Performed by: FAMILY MEDICINE

## 2023-11-01 PROCEDURE — 3074F SYST BP LT 130 MM HG: CPT | Performed by: FAMILY MEDICINE

## 2023-11-01 PROCEDURE — 3078F DIAST BP <80 MM HG: CPT | Performed by: FAMILY MEDICINE

## 2023-11-01 RX ORDER — MECLIZINE HYDROCHLORIDE 25 MG/1
TABLET ORAL
COMMUNITY
Start: 2023-09-28 | End: 2023-11-01 | Stop reason: ALTCHOICE

## 2023-11-03 ENCOUNTER — HOSPITAL ENCOUNTER (OUTPATIENT)
Dept: CV DIAGNOSTICS | Facility: HOSPITAL | Age: 53
Discharge: HOME | End: 2023-11-03
Attending: FAMILY MEDICINE
Payer: COMMERCIAL

## 2023-11-03 DIAGNOSIS — R42 DIZZINESS: ICD-10-CM

## 2023-11-03 DIAGNOSIS — R00.2 PALPITATIONS: ICD-10-CM

## 2023-11-03 PROCEDURE — 93247 EXT ECG>7D<15D SCAN A/R: CPT | Performed by: FAMILY MEDICINE

## 2023-11-03 PROCEDURE — 93246 EXT ECG>7D<15D RECORDING: CPT | Performed by: FAMILY MEDICINE

## 2024-01-05 ENCOUNTER — PATIENT MESSAGE (OUTPATIENT)
Dept: FAMILY MEDICINE CLINIC | Facility: CLINIC | Age: 54
End: 2024-01-05

## 2024-01-05 DIAGNOSIS — D05.11 DUCTAL CARCINOMA IN SITU (DCIS) OF RIGHT BREAST: Primary | ICD-10-CM

## 2024-01-08 NOTE — TELEPHONE ENCOUNTER
From: Suzan Lewis  To: Dottie Cash  Sent: 2024 10:04 AM CST  Subject: Referral renewals    Dear Dr. Cash,    The referrals for Dr. So (Ref# [id number]) and Dr. Valadez (Ref# [id number])  on 2024. Please renew these referrals as I have upcoming visits on [place]. 15 and .    Best regards,    Suzan Lewis

## 2024-01-15 ENCOUNTER — HOSPITAL ENCOUNTER (OUTPATIENT)
Dept: MAMMOGRAPHY | Facility: HOSPITAL | Age: 54
Discharge: HOME | End: 2024-01-15
Attending: FAMILY MEDICINE
Payer: COMMERCIAL

## 2024-01-15 ENCOUNTER — HOSPITAL ENCOUNTER (OUTPATIENT)
Dept: MAMMOGRAPHY | Facility: HOSPITAL | Age: 54
Discharge: HOME | End: 2024-01-15
Attending: INTERNAL MEDICINE
Payer: COMMERCIAL

## 2024-01-15 DIAGNOSIS — C50.411 MALIGNANT NEOPLASM OF UPPER-OUTER QUADRANT OF RIGHT BREAST IN FEMALE, ESTROGEN RECEPTOR POSITIVE  (HCC): Primary | ICD-10-CM

## 2024-01-15 DIAGNOSIS — Z17.0 MALIGNANT NEOPLASM OF UPPER-OUTER QUADRANT OF RIGHT BREAST IN FEMALE, ESTROGEN RECEPTOR POSITIVE  (HCC): Primary | ICD-10-CM

## 2024-01-15 DIAGNOSIS — Z17.0 MALIGNANT NEOPLASM OF UPPER-OUTER QUADRANT OF RIGHT BREAST IN FEMALE, ESTROGEN RECEPTOR POSITIVE  (HCC): ICD-10-CM

## 2024-01-15 DIAGNOSIS — C50.411 MALIGNANT NEOPLASM OF UPPER-OUTER QUADRANT OF RIGHT BREAST IN FEMALE, ESTROGEN RECEPTOR POSITIVE  (HCC): ICD-10-CM

## 2024-01-15 DIAGNOSIS — Z12.31 ENCOUNTER FOR MAMMOGRAM TO ESTABLISH BASELINE MAMMOGRAM: ICD-10-CM

## 2024-01-15 PROCEDURE — 77066 DX MAMMO INCL CAD BI: CPT | Performed by: INTERNAL MEDICINE

## 2024-01-15 PROCEDURE — 76642 ULTRASOUND BREAST LIMITED: CPT | Performed by: INTERNAL MEDICINE

## 2024-01-15 PROCEDURE — 77062 BREAST TOMOSYNTHESIS BI: CPT | Performed by: INTERNAL MEDICINE

## 2024-01-16 PROBLEM — C50.411 MALIGNANT NEOPLASM OF UPPER-OUTER QUADRANT OF RIGHT BREAST IN FEMALE, ESTROGEN RECEPTOR POSITIVE  (HCC): Status: ACTIVE | Noted: 2024-01-16

## 2024-01-16 PROBLEM — Z17.0 MALIGNANT NEOPLASM OF UPPER-OUTER QUADRANT OF RIGHT BREAST IN FEMALE, ESTROGEN RECEPTOR POSITIVE (HCC): Status: ACTIVE | Noted: 2024-01-16

## 2024-01-16 PROBLEM — C50.411 MALIGNANT NEOPLASM OF UPPER-OUTER QUADRANT OF RIGHT BREAST IN FEMALE, ESTROGEN RECEPTOR POSITIVE (HCC): Status: ACTIVE | Noted: 2024-01-16

## 2024-01-16 PROBLEM — Z17.0 MALIGNANT NEOPLASM OF UPPER-OUTER QUADRANT OF RIGHT BREAST IN FEMALE, ESTROGEN RECEPTOR POSITIVE  (HCC): Status: ACTIVE | Noted: 2024-01-16

## 2024-01-18 ENCOUNTER — OFFICE VISIT (OUTPATIENT)
Dept: FAMILY MEDICINE CLINIC | Facility: CLINIC | Age: 54
End: 2024-01-18
Payer: COMMERCIAL

## 2024-01-18 VITALS
SYSTOLIC BLOOD PRESSURE: 94 MMHG | WEIGHT: 125 LBS | HEART RATE: 71 BPM | TEMPERATURE: 97 F | OXYGEN SATURATION: 96 % | BODY MASS INDEX: 22 KG/M2 | RESPIRATION RATE: 18 BRPM | DIASTOLIC BLOOD PRESSURE: 52 MMHG

## 2024-01-18 DIAGNOSIS — J18.9 COMMUNITY ACQUIRED PNEUMONIA, UNSPECIFIED LATERALITY: Primary | ICD-10-CM

## 2024-01-18 DIAGNOSIS — R05.1 ACUTE COUGH: ICD-10-CM

## 2024-01-18 PROCEDURE — 3078F DIAST BP <80 MM HG: CPT | Performed by: FAMILY MEDICINE

## 2024-01-18 PROCEDURE — 3074F SYST BP LT 130 MM HG: CPT | Performed by: FAMILY MEDICINE

## 2024-01-18 PROCEDURE — 99214 OFFICE O/P EST MOD 30 MIN: CPT | Performed by: FAMILY MEDICINE

## 2024-01-18 RX ORDER — PREDNISONE 20 MG/1
TABLET ORAL
Qty: 10 TABLET | Refills: 0 | Status: SHIPPED | OUTPATIENT
Start: 2024-01-18

## 2024-01-18 RX ORDER — LEVOFLOXACIN 750 MG/1
TABLET, FILM COATED ORAL
COMMUNITY
Start: 2024-01-11

## 2024-01-19 NOTE — PROGRESS NOTES
CHIEF COMPLAINT:   Chief Complaint   Patient presents with    Follow - Up     Pneumonia          HPI:     Suzan Lewis is a 53 year old female presents for pna f-u.    Pneumonia f-u: pt was seen at the [facility] urgent care on 24 for cough, SOB, and chest congestion.  She was tested and negative fro Covid, Flu, and RSV.  XR Chest shows mild perihilar opacities.  She was Rx'd Levaquin 750 mg x 5 days . She completed this and is feeling a little better. Cough is not all of the time, but has \"cough attacks.\"  No F/C. She has no wheezing, but feels like \"something is stuck\" in her chest when she takes deep breaths.              HISTORY:  Past Medical History:   Diagnosis Date    Allergic rhinitis     Back pain 2002    Breast cancer ([facility]) 2023    IDC    Cancer ([facility]) 2023    Right breast cancer surgically removed and treated with radiation    Chronic sinusitis 2007    Non-allergic rhinitis       Past Surgical History:   Procedure Laterality Date    COLONOSCOPY  2019    LUMPECTOMY RIGHT        Aug. 8, 2001 and 2003    ORAL SURGERY  2016    RADIATION RIGHT        Family History   Problem Relation Age of Onset    Breast Cancer Self     Depression Mother     Dementia Mother         alzheimer at 70 yr old    Dementia Maternal Grandmother 70      Social History:   Social History     Socioeconomic History    Marital status:    Tobacco Use    Smoking status: Never    Smokeless tobacco: Never   Vaping Use    Vaping Use: Never used   Substance and Sexual Activity    Alcohol use: No    Drug use: No   Other Topics Concern    Caffeine Concern No    Stress Concern No    Weight Concern No    Special Diet No    Exercise Yes     Comment: Play [employer] 5 times or more a week    Seat Belt Yes        Medications (Active prior to today's visit):  Current Outpatient Medications   Medication Sig Dispense Refill    levoFLOXacin 750 MG Oral Tab       predniSONE 20 MG Oral Tab Take 2 tab (40 MG) PO  QD x 5 days 10 tablet 0    tamoxifen 20 MG Oral Tab Take 1 tablet (20 mg total) by mouth daily. 90 tablet 3       Allergies:  Allergies   Allergen Reactions    Penicillins      Pt tested positive to allergy       PSFH elements reviewed from today and agreed except as otherwise stated in HPI.  ROS:     Review of Systems   Constitutional:  Negative for appetite change, chills, fatigue and fever.   Respiratory:  Positive for cough. Negative for chest tightness, shortness of breath and wheezing.    Gastrointestinal:  Negative for abdominal pain and diarrhea.         Pertinent positives and negatives noted in the the HPI.    PHYSICAL EXAM:   BP 94/52 (BP Location: Left arm, Patient Position: Sitting, Cuff Size: adult)   Pulse 71   Temp 97.1 °F (36.2 °C) (Temporal)   Resp 18   Wt 125 lb (56.7 kg)   LMP 01/17/2024 (Approximate)   SpO2 96%   BMI 21.79 kg/m²   Vital signs reviewed.Appears stated age, well groomed.  Physical Exam  Vitals reviewed.   Constitutional:       Appearance: Normal appearance.   HENT:      Head: Normocephalic.      Right Ear: Tympanic membrane, ear canal and external ear normal.      Left Ear: Tympanic membrane, ear canal and external ear normal.      Nose: No congestion or rhinorrhea.   Cardiovascular:      Rate and Rhythm: Normal rate and regular rhythm.      Pulses: Normal pulses.      Heart sounds: Normal heart sounds.   Pulmonary:      Effort: Pulmonary effort is normal. No respiratory distress.      Breath sounds: Normal breath sounds. No wheezing.   Musculoskeletal:      Cervical back: Normal range of motion and neck supple.   Lymphadenopathy:      Cervical: No cervical adenopathy.   Neurological:      Mental Status: She is alert and oriented to person, place, and time.          LABS     No visits with results within 2 Month(s) from this visit.   Latest known visit with results is:   Office Visit on 02/10/2023   Component Date Value    CHOLESTEROL, TOTAL 02/21/2023 192     HDL  CHOLESTEROL 02/21/2023 65     TRIGLYCERIDES 02/21/2023 76     LDL-CHOLESTEROL 02/21/2023 110 (H)     CHOL/HDLC RATIO 02/21/2023 3.0     NON-HDL CHOLESTEROL 02/21/2023 127     GLUCOSE 02/21/2023 85     UREA NITROGEN (BUN) 02/21/2023 10     CREATININE 02/21/2023 0.68     EGFR 02/21/2023 106     BUN/CREATININE RATIO 02/21/2023 NOT APPLICABLE     SODIUM 02/21/2023 138     POTASSIUM 02/21/2023 4.1     CHLORIDE 02/21/2023 106     CARBON DIOXIDE 02/21/2023 27     CALCIUM 02/21/2023 8.9     PROTEIN, TOTAL 02/21/2023 6.3     ALBUMIN 02/21/2023 4.3     GLOBULIN 02/21/2023 2.0     ALBUMIN/GLOBULIN RATIO 02/21/2023 2.2     BILIRUBIN, TOTAL 02/21/2023 0.6     ALKALINE PHOSPHATASE 02/21/2023 31 (L)     AST 02/21/2023 12     ALT 02/21/2023 12     WHITE BLOOD CELL COUNT 02/21/2023 6.2     RED BLOOD CELL COUNT 02/21/2023 3.83     HEMOGLOBIN 02/21/2023 12.1     HEMATOCRIT 02/21/2023 36.4     MCV 02/21/2023 95.0     MCH 02/21/2023 31.6     MCHC 02/21/2023 33.2     RDW 02/21/2023 11.8     PLATELET COUNT 02/21/2023 237     MPV 02/21/2023 10.7     ABSOLUTE NEUTROPHILS 02/21/2023 4,148     ABSOLUTE LYMPHOCYTES 02/21/2023 1,445     ABSOLUTE MONOCYTES 02/21/2023 397     ABSOLUTE EOSINOPHILS 02/21/2023 149     ABSOLUTE BASOPHILS 02/21/2023 62     NEUTROPHILS 02/21/2023 66.9     LYMPHOCYTES 02/21/2023 23.3     MONOCYTES 02/21/2023 6.4     EOSINOPHILS 02/21/2023 2.4     BASOPHILS 02/21/2023 1.0     TSH W/REFLEX TO FT4 02/21/2023 1.33     Occult Blood 02/15/2023 Negative     HPV High Risk 02/10/2023 Negative     HPV Source 02/10/2023 Endocervix     Interpretation/Result 02/10/2023 Negative for intraepithelial lesion or malignancy     Specimen Adequacy 02/10/2023 Satisfactory for evaluation. Endocervical or metaplastic cells present     General Categorization 02/10/2023 Negative for intraepithelial lesion or malignancy     HPV High Risk mRNA 02/10/2023                      Value:This result contains rich text formatting which cannot be displayed  here.    Recommendations/Comments 02/10/2023                      Value:This result contains rich text formatting which cannot be displayed here.    Procedure 02/10/2023                      Value:This result contains rich text formatting which cannot be displayed here.    Clinical Information 02/10/2023                      Value:This result contains rich text formatting which cannot be displayed here.    Reason for testing 02/10/2023 Screening     Gyn Additional Informati* 02/10/2023                      Value:This result contains rich text formatting which cannot be displayed here.    Case Report 02/10/2023                      Value:Gynecologic Cytology                              Case: [id number]                                  Authorizing Provider:  Dottie Cash,   Collected:           02/10/2023 10:22 AM                                 MD                                                                           Ordering Location:     [facility]    Received:            02/14/2023 11:01 AM                                 [facility] Screen:          Erica Wright                                                                Specimen:    ThinPrep Imager Screening Pap, Endocervix                                                    REVIEWED THIS VISIT  ASSESSMENT/PLAN:   53 year old female with    1. Community acquired pneumonia, unspecified laterality  - was at [facility] on 1/11 and treated with Levaquin  - sx have improved but has lingering chest congestions  - START Prednisone 40 mg x 5d , R/B/SE of new med d /w pt  - d/w pt supportive care  - if no improvement after then advised to f-u    - predniSONE 20 MG Oral Tab; Take 2 tab (40 MG) PO QD x 5 days  Dispense: 10 tablet; Refill: 0    2. Acute cough  See above.    - predniSONE 20 MG Oral Tab; Take 2 tab (40 MG) PO QD x 5 days  Dispense: 10 tablet; Refill: 0      Meds  This Visit:  Requested Prescriptions     Signed Prescriptions Disp Refills    predniSONE 20 MG Oral Tab 10 tablet 0     Sig: Take 2 tab (40 MG) PO QD x 5 days       Health Maintenance:  Health Maintenance   Topic Date Due    Pneumococcal Vaccine: Birth to 64yrs (1 of 2 - PCV) Never done    COVID-19 Vaccine (4 - 2023-24 season) 09/01/2023    Annual Physical  02/10/2024    Colorectal Cancer Screening  02/15/2024    Mammogram  01/15/2025    Pap Smear  02/10/2028    DTaP,Tdap,and Td Vaccines (3 - Td or Tdap) 02/10/2033    Influenza Vaccine  Completed    Annual Depression Screening  Completed    Zoster Vaccines  Completed         Patient/Caregiver Education: There are no barriers to learning. Medical education done.   Outcome: Patient verbalizes understanding and agrees with plan. Advised to call or RTC if symptoms persist or worsen.    Problem List:     Patient Active Problem List   Diagnosis    Non-allergic rhinitis    Patellofemoral arthritis of right knee    Perioral dermatitis    Malignant neoplasm of upper-outer quadrant of right breast in female, estrogen receptor positive  (HCC)       Imaging & Referrals:  None     1/19/2024  Dottie Cash MD      Patient understands plan and follow-up.  Return for annual physical due after 2/2024.

## 2024-01-23 ENCOUNTER — OFFICE VISIT (OUTPATIENT)
Dept: SURGERY | Facility: CLINIC | Age: 54
End: 2024-01-23
Payer: COMMERCIAL

## 2024-01-23 VITALS
SYSTOLIC BLOOD PRESSURE: 92 MMHG | TEMPERATURE: 98 F | RESPIRATION RATE: 16 BRPM | DIASTOLIC BLOOD PRESSURE: 47 MMHG | HEIGHT: 63.5 IN | HEART RATE: 72 BPM | WEIGHT: 128.81 LBS | BODY MASS INDEX: 22.54 KG/M2 | OXYGEN SATURATION: 100 %

## 2024-01-23 DIAGNOSIS — Z17.0 MALIGNANT NEOPLASM OF UPPER-OUTER QUADRANT OF RIGHT BREAST IN FEMALE, ESTROGEN RECEPTOR POSITIVE  (HCC): Primary | ICD-10-CM

## 2024-01-23 DIAGNOSIS — C50.411 MALIGNANT NEOPLASM OF UPPER-OUTER QUADRANT OF RIGHT BREAST IN FEMALE, ESTROGEN RECEPTOR POSITIVE  (HCC): Primary | ICD-10-CM

## 2024-01-23 PROCEDURE — 99204 OFFICE O/P NEW MOD 45 MIN: CPT | Performed by: SURGERY

## 2024-01-23 PROCEDURE — 3074F SYST BP LT 130 MM HG: CPT | Performed by: SURGERY

## 2024-01-23 PROCEDURE — 3008F BODY MASS INDEX DOCD: CPT | Performed by: SURGERY

## 2024-01-23 PROCEDURE — 3078F DIAST BP <80 MM HG: CPT | Performed by: SURGERY

## 2024-01-23 NOTE — PROGRESS NOTES
Breast Surgery New Patient Consultation    This is the first visit for this 53 year old woman, referred by Dr. Dottie Cash, who presents for evaluation of history of right breast cancer.    History of Present Illness:   Ms. Suzan Lewis is a 53 year old woman who presents with prior history of right breast cancer that was treated in [place].  She had the surgery in 2023 and underwent lumpectomy with sentinel lymph node biopsy.  She did receive 20 total treatments of radiation and has been tolerating tamoxifen.  She recently began surveillance under the care of Dr. Valadez for this.  She had a bilateral diagnostic evaluation on January 15, 2024 for surveillance and was found to have postlumpectomy changes in the upper outer right breast with faint calcifications that appeared stable in the right breast and a probable benign complicated cyst in the left breast which a 6-month surveillance ultrasound was recommended.   She is here today for evaluation and recommendations for further therapy.        Past Medical History:   Diagnosis Date    Allergic rhinitis     Back pain 2002    Breast cancer (HCC) 2023    IDC    Cancer (HCC) 2023    Right breast cancer surgically removed and treated with radiation    Chronic sinusitis 2007    Non-allergic rhinitis        Past Surgical History:   Procedure Laterality Date    COLONOSCOPY  2019    LUMPECTOMY RIGHT        Aug. 8, 2001 and 2003    ORAL SURGERY  2016    RADIATION RIGHT         Gynecological History:  Pt is a   Pt was 31 years old at time of first pregnancy.    She has cumulative breastfeeding history of 24 months, last unknown.  She achieved menarche at age 15 and LMP 2024  She denies any history of hormone replacement therapy.  She denies any history of oral contraceptive use.  She denies infertility treatment to achieve pregnancy.    Medications:    No outpatient medications have been marked as taking for the  1/23/24 encounter (Appointment) with Katelyn So MD.       Allergies:    Allergies   Allergen Reactions    Penicillins      Pt tested positive to allergy       Family History:   Family History   Problem Relation Age of Onset    Breast Cancer Self     Depression Mother     Dementia Mother         alzheimer at 70 yr old    Dementia Maternal Grandmother 70       She is not of [employer] ancestry.    Social History:  History   Alcohol Use No       History   Smoking Status    Never   Smokeless Tobacco    Never     Ms. Suzan Lewis is  with 2 children. She has 2 siblings. She is currently Homemaker      Review of Systems:  General:   The patient denies, fever, chills, night sweats, fatigue, generalized weakness, change in appetite or weight loss.    HEENT:     The patient denies eye irritation, cataracts, redness, glaucoma, yellowing of the eyes, change in vision or color blindness. The patient denies hearing loss, ringing in the ears, ear drainage, earaches, nasal congestion, nose bleeds, snoring, pain in mouth/throat, hoarseness, change in voice, facial trauma.    Respiratory:  The patient denies +chronic cough, phlegm, hemoptysis, pleurisy/chest pain, pneumonia, asthma, wheezing, difficulty in breathing with exertion, emphysema, chronic bronchitis, shortness of breath or abnormal sound when breathing.     Cardiovascular:  There is no history of chest pain, chest pressure/discomfort, palpitations, irregular heartbeat, fainting or near-fainting, difficulty breathing when lying flat, SOB/Coughing at night, swelling of the legs or chest pain while walking.    Breasts:  See history of present illness    Gastrointestinal:     There is no history of difficulty or pain with swallowing, reflux symptoms, vomiting, dark or bloody stools, +constipation, yellowing of the skin, indigestion, nausea, change in bowel habits, diarrhea, abdominal pain or vomiting blood.     Genitourinary:  The patient denies +frequent  urination, +needing to get up at night to urinate, +urinary hesitancy or retaining urine, painful urination, +urinary incontinence, decreased urine stream, blood in the urine or vaginal/penile discharge.    Skin:    The patient denies rash, itching, skin lesions, dry skin, change in skin color or change in moles.     Hematologic/Lymphatic:  The patient denies easily bruising or bleeding or persistent swollen glands or lymph nodes.     Musculoskeletal:  The patient denies muscle aches/pain, joint pain, stiff joints, +neck pain, +back pain or bone pain.    Neuropsychiatric:  There is no history of migraines or severe headaches, seizure/epilepsy, speech problems, coordination problems, trembling/tremors, fainting/black outs, dizziness, memory problems, loss of sensation/numbness, problems walking, weakness, tingling or burning in hands/feet. There is no history of abusive relationship, bipolar disorder, sleep disturbance, anxiety, depression or feeling of despair.    Endocrine:    There is no history of poor/slow wound healing, weight loss/gain, fertility or hormone problems, cold intolerance, thyroid disease.     Allergic/Immunologic:  There is no history of hives, hay fever, angioedema or anaphylaxis.    BP 92/47 (BP Location: Left arm, Patient Position: Sitting, Cuff Size: adult)   Pulse 72   Temp 97.8 °F (36.6 °C) (Temporal)   Resp 16   Ht 1.613 m (5' 3.5\")   Wt 58.4 kg (128 lb 12.8 oz)   LMP 01/17/2024 (Approximate)   SpO2 100%   BMI 22.46 kg/m²     Physical Exam:  The patient is an alert, oriented, well-nourished and  well-developed woman who appears her stated age. Her speech patterns and movements are normal. Her affect is appropriate.    HEENT: The head is normocephalic. The neck is supple. The thyroid is not enlarged and is without palpable masses/nodules. There are no palpable masses. The trachea is in the midline. Conjunctiva are clear, non-icteric.    Chest: The chest expands symmetrically. The  lungs are clear to auscultation.    Heart: The rhythm is regular.  There are no murmurs, rubs, gallops or thrills.    Breasts:  Her breasts are symmetrical with a cup size 34B.  Right breast: The skin, nipple ,and areola appear normal. There is no skin dimpling with movement of the pectoralis. There is no nipple retraction. No nipple discharge can be elicited. The parenchyma is mildly nodular. There are no dominant masses in the breast. The axillary tail is normal.  Is a well-healed incision in the breast with no signs of new or recurrent disease.  Left breast:   The skin, nipple, and areola appear normal. There is no skin dimpling with movement of the pectoralis. There is no nipple retraction. No nipple discharge can be elicited. The parenchyma is mildly nodular. There are no dominant masses in the breast. The axillary tail is normal.      Abdomen:  The abdomen is soft, flat and non tender. The liver is not enlarged. There are no palpable masses.    Lymph Nodes:  The supraclavicular, axillary and cervical regions are free of significant lymphadenopathy.    Back: There is no vertebral column tenderness.    Skin: The skin appears normal. There are no suspicious appearing rashes or lesions.    Extremities: The extremities are without deformity, cyanosis or edema.    Impression:   Ms. Suzan Lewis is a 53 year old woman presents with history of right breast cancer status postlumpectomy with left breast complicated cyst.    Discussion and Plan:  I had a discussion with the Patient regarding her breast exam. On exam today I found feeling well since her prior surgery with no signs of new or concerning clinical findings.  I personally reviewed the recent imaging which is concordant her clinical exam.  We discussed standard of care high risk protocol status post her lumpectomy for the right breast cancer.  Incidentally, she has a complex cyst of the left breast with that we will we will also require surveillance.  For this  purpose we will plan for a bilateral diagnostic evaluation in July 2024 with a clinical exam to follow.  She will benefit from annual breast MRI given the density and age of diagnosis as well.  She will follow with medical oncology for management of her tamoxifen. She was given ample opportunity for questions and those questions were answered to her satisfaction. She has been  encouraged to contact the office with any questions or concerns prior to her next appointment.

## 2024-02-01 ENCOUNTER — OFFICE VISIT (OUTPATIENT)
Dept: HEMATOLOGY/ONCOLOGY | Age: 54
End: 2024-02-01
Attending: INTERNAL MEDICINE
Payer: COMMERCIAL

## 2024-02-01 VITALS
TEMPERATURE: 98 F | HEART RATE: 80 BPM | BODY MASS INDEX: 21 KG/M2 | SYSTOLIC BLOOD PRESSURE: 138 MMHG | OXYGEN SATURATION: 98 % | DIASTOLIC BLOOD PRESSURE: 73 MMHG | RESPIRATION RATE: 18 BRPM | WEIGHT: 123 LBS

## 2024-02-01 DIAGNOSIS — Z17.0 MALIGNANT NEOPLASM OF UPPER-OUTER QUADRANT OF RIGHT BREAST IN FEMALE, ESTROGEN RECEPTOR POSITIVE  (HCC): Primary | ICD-10-CM

## 2024-02-01 DIAGNOSIS — R07.9 CHEST PAIN AT REST: ICD-10-CM

## 2024-02-01 DIAGNOSIS — C50.411 MALIGNANT NEOPLASM OF UPPER-OUTER QUADRANT OF RIGHT BREAST IN FEMALE, ESTROGEN RECEPTOR POSITIVE  (HCC): Primary | ICD-10-CM

## 2024-02-01 PROCEDURE — 99214 OFFICE O/P EST MOD 30 MIN: CPT | Performed by: INTERNAL MEDICINE

## 2024-02-01 NOTE — PROGRESS NOTES
Cancer Center Progress Note    Problem List:      Patient Active Problem List   Diagnosis    Non-allergic rhinitis    Patellofemoral arthritis of right knee    Perioral dermatitis    Malignant neoplasm of upper-outer quadrant of right breast in female, estrogen receptor positive  (HCC)       Interim History:    Suzan Lewis presents today for evaluation and management of a diagnosis of right breast cancer.    She started tamoxifen in 10/2023. She has difficulty sleeping. She had difficulty prior to starting tamoxifen but this has gotten worse over the last few weeks. She has not had significant hot flashes. She has no fever or sweats. She has no dyspnea or cough. She has had some new pain in the right chest. She was diagnosed with pneumonia three weeks ago. She had an xray at an urgent care. She had an antibiotic course. She also had 5 days of a corticosteroid with improvement.    Her oncology history started while visiting in [place] when she had an ultrasound of the breasts. They're was an irregular right breast mass. She had work up with mammogram and MRI of the breast. She had biopsy of the mass that was positive for invasive breast cancer. She proceeded to have lumpectomy on 7/5/2023. There was an invasive ductal carcinoma, grade II. The margins were negative. The size of the lumpectomy specimen removed was 2 x 2 x 0.5 cm. Pathology did not state a pathologic size. I do not have the reports from the radiologic imaging. There were three negative SLN's. The ER was 90%, pR was 90%, Her2 1+ and Ki-67 was 10%. She had Oncotype Dx testing with RS 6.      Review of Systems:   Constitutional: Negative for anorexia, fevers, chills, night sweats and weight loss.  Eyes: Negative for visual disturbance, irritation and redness.  Respiratory: Negative for cough, hemoptysis, chest pain, or dyspnea.  Cardiovascular: Negative for angina, orthopnea or palpitations.  Gastrointestinal: Negative for nausea, vomiting, change in bowel  habits, diarrhea, constipation and abdominal pain.  Integument/breast: Negative for rash, skin lesions, and pruritus.  Hematologic/lymphatic: Negative for easy bruising, bleeding, and lymphadenopathy.  Genitourinary: Negative for dysuria or hematuria  Neurological: Negative for headaches, dizziness, speech problems, gait problems and focal weakness.  Psychiatric: The patient's mood was calm and appropriate for this visit.  The pertinent positives and negatives were described. All other systems were negative.    PMH/PSH:  Past Medical History:   Diagnosis Date    Allergic rhinitis     Back pain 2002    Breast cancer (HCC) 2023    IDC    Cancer (HCC) 2023    Right breast cancer surgically removed and treated with radiation    Chronic sinusitis 2007    Non-allergic rhinitis     Pneumonia 2024       Past Surgical History:   Procedure Laterality Date    COLONOSCOPY  2019    LUMPECTOMY RIGHT        Aug. 8, 2001 and 2003    ORAL SURGERY  2016    RADIATION RIGHT         Family History Reviewed:  Family History   Problem Relation Age of Onset    Breast Cancer Self     Depression Mother     Dementia Mother         alzheimer at 70 yr old    Dementia Maternal Grandmother 70       Allergies:     Allergies   Allergen Reactions    Penicillins      Pt tested positive to allergy       Medications:  No outpatient medications have been marked as taking for the 24 encounter (Office Visit) with Dale Valadez MD.         Vital Signs:      Height: --  Weight: 55.8 kg (123 lb) (1043)  BSA (Calculated - sq m): --  Pulse: 80 (1043)  BP: 138/73 (1043)  Temp: 97.5 °F (36.4 °C) (1043)  Do Not Use - Resp Rate: --  SpO2: 98 % (1043)      Performance Status:  ECOG 0: Fully active, able to carry on all pre-disease performance without restriction     Physical Examination:    Constitutional: Patient is alert and oriented x 3, not in acute distress.  Eyes: Anicteric  sclera, pink conjunctiva.  HEENT:  Oropharynx is clear. Neck is supple.  Respiratory: Clear to auscultation and percussion. No rales.  No wheezes.  Cardiovascular: Regular rate and rhythm. No murmurs.  Gastrointestinal: Soft, non tender with good bowel sounds.  Musculoskeletal: No edema. No calf tenderness. She has tenderness over the right chest and lateral ribs.  Neurological: Grossly intact without focal motor or sensory deficit.  Skin: No suspicious skin lesion, no rash, no ulceration.  Lymphatics: There is no palpable lymphadenopathy throughout in the cervical, supraclavicular, or axillary regions.  Psychiatric: The patient's mood is calm and appropriate for this visit.      Labs reviewed at this visit:     Lab Results   Component Value Date    WBC 6.2 02/21/2023    RBC 3.83 02/21/2023    HGB 12.1 02/21/2023    HCT 36.4 02/21/2023    MCV 95.0 02/21/2023    MCH 31.6 02/21/2023    MCHC 33.2 02/21/2023    RDW 11.8 02/21/2023     02/21/2023     Lab Results   Component Value Date     02/21/2023    K 4.1 02/21/2023     02/21/2023    CO2 27 02/21/2023    BUN 10 02/21/2023    CREATSERUM 0.68 02/21/2023    GLU 85 02/21/2023    CA 8.9 02/21/2023    ALKPHO 31 (L) 02/21/2023    ALT 12 02/21/2023    AST 12 02/21/2023    BILT 0.6 02/21/2023    ALB 4.3 02/21/2023    TP 6.3 02/21/2023       Radiologic imaging reviewed at this visit:    Mammogram on 1/15/2024:  BREAST COMPOSITION:   Extremely dense, which lowers the sensitivity of mammography.     FINDINGS:    There are post lumpectomy changes in the upper outer posterior right breast.  Faint microcalcifications are seen in the upper outer right breast, slightly medial to the surgical clips at the lumpectomy site.  Per review of the patient's prior mammograms,  there appear to have been calcifications in this area dating back to 2014. These are therefore likely benign.     There is slight right breast skin thickening consistent with post treatment changes.      There are no spiculated masses, areas of nonsurgical distortion, or suspicious grouped calcifications in the right breast.     There are least 2 masses in the upper outer left breast.  These will be further evaluated with ultrasound.     Targeted ultrasound of the upper outer left breast demonstrates a benign simple cyst at the 2 o'clock position 3 cm from the nipple measuring 1.7 x 1.1 x 1.8 cm, accounting for the larger mass on mammography.  Another benign simple cyst is seen at the 3  o'clock position 3 cm from nipple measuring 4 x 3 x 5 mm.     1 o'clock position 6 cm from nipple:  There is a probable complicated cyst, part hypoechoic, part anechoic with posterior acoustic enhancement measuring 6 x 6 x 6 mm. No vascular flow is demonstrated on Doppler interrogation.  This likely correlates to a   previously imaged more simple appearing cyst in the study from 11/23/2021, at that time labeled at the 2 o'clock position 6 cm from nipple. This is likely benign.     Impression   CONCLUSION:    New post lumpectomy changes in the upper outer right breast.  Faint microcalcifications are also seen in the upper outer quadrant, which appear to be present on older prior mammograms.  Follow-up right breast mammogram in 6 months is recommended.     Probably benign probable complicated cyst in the left breast at the 1 o'clock position 6 cm from nipple.  Follow-up left breast ultrasound in 6 months is recommended.     In light of the patient's history of breast malignancy, her extremely dense breast tissue, the above described likely benign findings, and the lack of prior mammographic imaging over the past 2 years, recommend consideration of MRI for further  evaluation.     BI-RADS CATEGORY:    DIAGNOSTIC CATEGORY 3--PROBABLY BENIGN FINDING.          Assessment/Plan:     Invasive ductal carcinoma of the upper outer right breast:  Lumpectomy on 7/5/2023 in [place]  Tumor size up to 2 cm  SLN 0/3  T1N0  Oncotype Dx RS 6  Tamoxifen  started in 10/2023     She has been on tamoxifen for 3 months. She has right chest pain that likely is related to the surgery and radiation. I recommended that we proceed with CTA of the chest. She will schedule this. I recommended that she take benadryl or zyrtec at night to see if this helps with sleep. I will see her in 6 months.         Dale Valadez MD

## 2024-02-01 NOTE — PROGRESS NOTES
Education Record    Learner:  Patient/ spouse    Disease / Diagnosis: right breast cancer       Barriers / Limitations:  None   Comments:    Method:  Discussion   Comments:    General Topics:  Plan of care reviewed   Comments:    Outcome:  Shows understanding   Comments:   Breast imaging up to date, MRI scheduled March. taking Tamoxifen, started end of October.   Reports trouble sleeping, wakes up around midnight, then can't fall back to sleep for a few hours.   Missed one period since starting, LMP 1/17/24.   Denies hot flashes.   Had pneumonia 3 weeks ago, with some cough.     Reports last few weeks upper mid back pain

## 2024-02-16 ENCOUNTER — HOSPITAL ENCOUNTER (OUTPATIENT)
Dept: CT IMAGING | Age: 54
Discharge: HOME | End: 2024-02-16
Attending: INTERNAL MEDICINE
Payer: COMMERCIAL

## 2024-02-16 DIAGNOSIS — C50.411 MALIGNANT NEOPLASM OF UPPER-OUTER QUADRANT OF RIGHT BREAST IN FEMALE, ESTROGEN RECEPTOR POSITIVE  (HCC): ICD-10-CM

## 2024-02-16 DIAGNOSIS — K86.9 PANCREATIC LESION: Primary | ICD-10-CM

## 2024-02-16 DIAGNOSIS — Z17.0 MALIGNANT NEOPLASM OF UPPER-OUTER QUADRANT OF RIGHT BREAST IN FEMALE, ESTROGEN RECEPTOR POSITIVE  (HCC): ICD-10-CM

## 2024-02-16 DIAGNOSIS — R07.9 CHEST PAIN AT REST: ICD-10-CM

## 2024-02-16 LAB
CREAT BLD-MCNC: 0.7 MG/DL
EGFRCR SERPLBLD CKD-EPI 2021: 105 ML/MIN/1.73M2 (ref 60–?)

## 2024-02-16 PROCEDURE — 82565 ASSAY OF CREATININE: CPT

## 2024-02-16 PROCEDURE — 71275 CT ANGIOGRAPHY CHEST: CPT | Performed by: INTERNAL MEDICINE

## 2024-02-26 ENCOUNTER — TELEPHONE (OUTPATIENT)
Dept: FAMILY MEDICINE CLINIC | Facility: CLINIC | Age: 54
End: 2024-02-26

## 2024-02-26 DIAGNOSIS — K86.9 PANCREATIC LESION (HCC): Primary | ICD-10-CM

## 2024-02-26 NOTE — TELEPHONE ENCOUNTER
Patient called asking if we can help them with insurance authorization for the MRI order.    Called [facility] insurance to start case for patient. REF # [id number]  Per insurace representative [facility] is not in network for patient to complete test. Asked to check if [facility] was in network and representative states it is.      completed case and authorized it through [facility]. Please update order for [facility] (order pended).     Authorization # [id number]  Case # [id number]  Approved from: 2/26/24-4/11/24

## 2024-02-26 NOTE — TELEPHONE ENCOUNTER
LMOM to return call to the office. Provided pt office phone [phone] along with office hours.

## 2024-02-27 NOTE — TELEPHONE ENCOUNTER
Called spouse, informed of new order to [facility]. Spouse v/u.    Order faxed to [phone].

## 2024-03-05 ENCOUNTER — TELEPHONE (OUTPATIENT)
Dept: HEMATOLOGY/ONCOLOGY | Facility: HOSPITAL | Age: 54
End: 2024-03-05

## 2024-03-08 ENCOUNTER — PATIENT MESSAGE (OUTPATIENT)
Dept: FAMILY MEDICINE CLINIC | Facility: CLINIC | Age: 54
End: 2024-03-08

## 2024-03-08 ENCOUNTER — HOSPITAL ENCOUNTER (OUTPATIENT)
Dept: MRI IMAGING | Age: 54
Discharge: HOME | End: 2024-03-08
Attending: FAMILY MEDICINE
Payer: COMMERCIAL

## 2024-03-08 ENCOUNTER — HOSPITAL ENCOUNTER (OUTPATIENT)
Dept: MRI IMAGING | Facility: HOSPITAL | Age: 54
End: 2024-03-08
Attending: INTERNAL MEDICINE
Payer: COMMERCIAL

## 2024-03-08 DIAGNOSIS — K86.9 PANCREATIC LESION (HCC): Primary | ICD-10-CM

## 2024-03-08 DIAGNOSIS — K86.9 PANCREATIC LESION (HCC): ICD-10-CM

## 2024-03-08 PROCEDURE — 74183 MRI ABD W/O CNTR FLWD CNTR: CPT | Performed by: FAMILY MEDICINE

## 2024-03-08 PROCEDURE — 76376 3D RENDER W/INTRP POSTPROCES: CPT | Performed by: FAMILY MEDICINE

## 2024-03-08 PROCEDURE — A9575 INJ GADOTERATE MEGLUMI 0.1ML: HCPCS | Performed by: FAMILY MEDICINE

## 2024-03-08 RX ORDER — DIPHENHYDRAMINE HYDROCHLORIDE 50 MG/ML
10 INJECTION, SOLUTION INTRAMUSCULAR; INTRAVENOUS
Status: COMPLETED | OUTPATIENT
Start: 2024-03-08 | End: 2024-03-08

## 2024-03-08 RX ADMIN — DIPHENHYDRAMINE HYDROCHLORIDE 10 ML: 50 INJECTION, SOLUTION INTRAMUSCULAR; INTRAVENOUS at 09:30:00

## 2024-03-08 NOTE — TELEPHONE ENCOUNTER
From: Suzan Lewis  To: Dottie Cash  Sent: 3/8/2024 12:47 PM CST  Subject: Concern about pancreatic lesion    Dear Dr. Cash,    I had an Abdomen MRI done this morning and the results are out. Can I request a referral for consultation with Dr. Fabrice Simpson who was recommended by my radiation oncologist, Dr. Dain Krueger?    Respectfully,    Suzan

## 2024-03-11 ENCOUNTER — HOSPITAL ENCOUNTER (OUTPATIENT)
Dept: MRI IMAGING | Facility: HOSPITAL | Age: 54
Discharge: HOME | End: 2024-03-11
Attending: INTERNAL MEDICINE
Payer: COMMERCIAL

## 2024-03-11 DIAGNOSIS — C50.411 MALIGNANT NEOPLASM OF UPPER-OUTER QUADRANT OF RIGHT FEMALE BREAST (HCC): ICD-10-CM

## 2024-03-11 PROCEDURE — A9575 INJ GADOTERATE MEGLUMI 0.1ML: HCPCS | Performed by: INTERNAL MEDICINE

## 2024-03-11 PROCEDURE — 77049 MRI BREAST C-+ W/CAD BI: CPT | Performed by: INTERNAL MEDICINE

## 2024-03-11 RX ORDER — DIPHENHYDRAMINE HYDROCHLORIDE 50 MG/ML
10 INJECTION, SOLUTION INTRAMUSCULAR; INTRAVENOUS
Status: COMPLETED | OUTPATIENT
Start: 2024-03-11 | End: 2024-03-11

## 2024-03-11 RX ADMIN — DIPHENHYDRAMINE HYDROCHLORIDE 10 ML: 50 INJECTION, SOLUTION INTRAMUSCULAR; INTRAVENOUS at 16:55:00

## 2024-03-11 NOTE — TELEPHONE ENCOUNTER
He's at [facility].  It looks like he is a surgeon specializing in liver and pancreas. I'm not sure if their providers are built into our side of [facility]?     [email or web address]

## 2024-03-11 NOTE — TELEPHONE ENCOUNTER
Note from referrals dept is on referral; need to specify a provider.     Pt is wondering if she can be referred to Dr Simpson.     Please advise and add provider to referral.

## 2024-03-11 NOTE — TELEPHONE ENCOUNTER
I tried searchihg for Dr. Damon, I cannot find him int he search for Surgical Oncology.    Is this doctor at [facility]?      Thanks.

## 2024-03-12 NOTE — TELEPHONE ENCOUNTER
Thanks.  I found him with your help. Had to use Surgery Referral - External, since he is [facility] and not [facility].    Referral is signed.

## 2024-05-24 ENCOUNTER — NURSE TRIAGE (OUTPATIENT)
Dept: FAMILY MEDICINE CLINIC | Facility: CLINIC | Age: 54
End: 2024-05-24

## 2024-05-24 NOTE — TELEPHONE ENCOUNTER
Reason for Disposition   Fever > 100.0 F (37.8 C) and diabetes mellitus or a weak immune system (e.g., HIV positive, chemotherapy, splenectomy)    Answer Assessment - Initial Assessment Questions  1. TEMPERATURE: \"What is the most recent temperature?\"  \"How was it measured?\"       38.3 degrees celsius   2. ONSET: \"When did the fever start?\"       Last couple days  3. CHILLS: \"Do you have chills?\" If yes: \"How bad are they?\"  (e.g., none, mild, moderate, severe)    - NONE: no chills    - MILD: feeling cold    - MODERATE: feeling very cold, some shivering (feels better under a thick blanket)    - SEVERE: feeling extremely cold with shaking chills (general body shaking, rigors; even under a thick blanket)       Mild  4. OTHER SYMPTOMS: \"Do you have any other symptoms besides the fever?\"  (e.g., abdomen pain, cough, diarrhea, earache, headache, sore throat, urination pain)      Cough, headache, back pain  5. CAUSE: If there are no symptoms, ask: \"What do you think is causing the fever?\"       Son is also sick  6. CONTACTS: \"Does anyone else in the family have an infection?\"      Son has cough x 2 weeks  7. TREATMENT: \"What have you done so far to treat this fever?\" (e.g., medications)      Tylenol  8. IMMUNOCOMPROMISE: \"Do you have of the following: diabetes, HIV positive, splenectomy, cancer chemotherapy, chronic steroid treatment, transplant patient, etc.\"      Cancer  9. PREGNANCY: \"Is there any chance you are pregnant?\" \"When was your last menstrual period?\"      No  10. TRAVEL: \"Have you traveled out of the country in the last month?\" (e.g., travel history, exposures)        No    Protocols used: Fever-A-[place]    Spouse called, reports pt developed a fever and cough over the last few days. Reports fever of 38.3 degrees celsius. Pt has taken Tylenol and Advil for the fever.     Spouse reports their son has had a cough for about 2 weeks and was put on antibiotics.     Based on recent breast cancel diagnosis and pt  undergoing treatment, recommended she go to UC to get evaluated. Spouse v/u.

## 2024-07-31 ENCOUNTER — TELEPHONE (OUTPATIENT)
Dept: HEMATOLOGY/ONCOLOGY | Facility: HOSPITAL | Age: 54
End: 2024-07-31

## 2024-07-31 NOTE — TELEPHONE ENCOUNTER
Patient's  is calling because he was told he needs an order to schedule his wife's mammogram before her six month follow-up with Dr. Valadez. Called 7/31/24.

## 2024-07-31 NOTE — TELEPHONE ENCOUNTER
JAYESH verbal release reviewed     Lvm to let them know the order for mammogram is in the system. Can arrange by calling central scheduling. Left phone number [phone]

## 2024-08-08 ENCOUNTER — HOSPITAL ENCOUNTER (OUTPATIENT)
Dept: MAMMOGRAPHY | Facility: HOSPITAL | Age: 54
Discharge: HOME | End: 2024-08-08
Attending: SURGERY
Payer: COMMERCIAL

## 2024-08-08 DIAGNOSIS — Z17.0 MALIGNANT NEOPLASM OF UPPER-OUTER QUADRANT OF RIGHT BREAST IN FEMALE, ESTROGEN RECEPTOR POSITIVE (HCC): ICD-10-CM

## 2024-08-08 DIAGNOSIS — C50.411 MALIGNANT NEOPLASM OF UPPER-OUTER QUADRANT OF RIGHT BREAST IN FEMALE, ESTROGEN RECEPTOR POSITIVE (HCC): ICD-10-CM

## 2024-08-08 DIAGNOSIS — R92.8 ABNORMAL MAMMOGRAM: Primary | ICD-10-CM

## 2024-08-08 PROCEDURE — 77066 DX MAMMO INCL CAD BI: CPT | Performed by: SURGERY

## 2024-08-08 PROCEDURE — 77062 BREAST TOMOSYNTHESIS BI: CPT | Performed by: SURGERY

## 2024-08-08 PROCEDURE — 76642 ULTRASOUND BREAST LIMITED: CPT | Performed by: SURGERY

## 2024-08-08 NOTE — IMAGING NOTE
Suzan Lewis is recommended for a stereotactic/tomosynthesis guided biopsy of the right breast by   3915: Spoke with Suzan Lewis.  Ms. Lewis's preferred language is Mandarin  With translation assist from Ms. Lewis's  Jhonathan history obtained, medications and allergies reviewed, procedure explained.     History -Malignant neoplasm of upper-outer quadrant of right breast   Findings- Increasing calcifications near the site of the patient's lumpectomy in the upper outer RIGHT breast at the 11 o'clock position.  Recommendation- STEREOTACTIC BIOPSY WITH 3D/LIDIA RIGHT BREAST See EMR for complete imaging report    Medications and allergies reviewed:  Current Outpatient Medications   Medication Sig Dispense Refill    tamoxifen 20 MG Oral Tab Take 1 tablet (20 mg total) by mouth daily. 90 tablet 3   The following allergies were reported-  Penicillins     Suzan Lewis denies the use of prescribed anticoagulants, denies known bleeding disorders and/or liver disease, denies chemotherapy    Procedure explained to Ms. Lewis and her . No questions at this time.  Suzan Lewis provided with written educational material by the imaging staff at the time of the recommendation.    Ms. Lewis instructed to take 1000 mg of acetaminophen on the day of the biopsy, eat a light meal, and bring or wear a sport bra.  Post biopsy care and instruction reviewed: including no lifting more than five pounds, no upper body exercise, icing of biopsy site, no submerging in water.  Suzan Lewis verbalized understanding.    Order in epic  Ms. Lewis  transferred to the breast center  for an appointment.

## 2024-08-14 ENCOUNTER — HOSPITAL ENCOUNTER (OUTPATIENT)
Dept: MAMMOGRAPHY | Facility: HOSPITAL | Age: 54
Discharge: HOME | End: 2024-08-14
Attending: FAMILY MEDICINE
Payer: COMMERCIAL

## 2024-08-14 DIAGNOSIS — R92.8 ABNORMAL MAMMOGRAM: ICD-10-CM

## 2024-08-14 PROCEDURE — 88305 TISSUE EXAM BY PATHOLOGIST: CPT | Performed by: FAMILY MEDICINE

## 2024-08-14 PROCEDURE — 19081 BX BREAST 1ST LESION STRTCTC: CPT | Performed by: FAMILY MEDICINE

## 2024-08-14 NOTE — DISCHARGE INSTRUCTIONS
Discharge Instructions  Stereotactic / Ultrasound / MRI Core Biopsy     Place an ice pack on top of the biopsy site in your bra OR the folds of the Ace wrap for 10-15 minutes every hour until bedtime for your comfort and to decrease bleeding.     Keep your supportive bra OR the Ace wrap in place for 24 hours after your biopsy. This compression decreases bleeding and breast movement for your comfort. Wear a supportive bra for the next couple days for comfort (as well as for sleeping)     No bath or shower during the first 24 hours after biopsy. After this time, you may take a bath or shower. It’s okay if the steri-strips get wet but don’t soak them.   No saunas, hot tubs, or swimming pools until the steri-strips fall off (5-7days).  This prevents infection and allows time for the area to completely close and heal.     DO NOT remove the steri-strips. They will fall off in 5 days to two weeks. If any type of irritation (redness, itching, OR blisters) develops in the area around the steri-strips, remove them gently. Keep the area clean and dry.     It is normal to have mild discomfort and bruising at the biopsy site.      You may take Tylenol as needed for discomfort.     DO NOT participate in strenuous activity (aerobics, heavy lifting, housework, gardening, etc.) 48 hours after your biopsy to prevent bleeding.     You will receive results typically within 2-3 business days. Occasionally, the specimen is sent off-site for a 2nd opinion. You will be notified when this occurs as this will delay your results.     If you have any questions about the procedure or your results, please contact the Breast Care Coordinator Nurse at [phone]. (M-F 7:30-4)    If you are having a MRI Breast Biopsy or an Ultrasound guided biopsy, you will be billed for the biopsy and a unilateral mammogram separately.    A 6-month or one year follow-up Diagnostic Mammogram/Ultrasound will be recommended to document stability of the biopsy  site. It may be scheduled at [facility] or [facility] by calling [phone]. You will need an order for this exam from your referring physician.       5/2024

## 2024-08-15 ENCOUNTER — TELEPHONE (OUTPATIENT)
Dept: GENERAL RADIOLOGY | Facility: HOSPITAL | Age: 54
End: 2024-08-15

## 2024-08-15 NOTE — IMAGING NOTE
1433: Spoke with Suzan Lewis and her  Jhonathan .  Ms. Lewis is post stereotactic right breast biopsy.  Ms. Lewis's preferred language is Mandarin. With the assistance of language line  Salbador ID # [id number]  -Introduced myself as breast nurse coordinator at [facility] and informed Ms. Lewis and her   of the purpose of my call.  -Name and date of birth verified by patient.    Suzan Debbie confirmed awareness of pathology results as below- MyChart notification    -Pathology results and recommendations discussed as follows: benign finding  Final Diagnosis:   Right breast, 11:00 calcifications, stereotactic biopsy:  -Breast tissue with stromal fibrosis, sclerosing adenosis and calcifications.   See EMR for complete pathology report    Per Dr. Gray- Pathology shows benign findings that are concordant with the imaging assessment.  Routine post lumpectomy follow-up imaging of the right breast in 6 months is recommended.    Suzan Debbie verbalized understanding and agreement to the above.    Reinforced post biopsy care and instruction.  Suzan Debbie denies any issues with biopsy site- bleeding, drainage, redness, tenderness.   Suzan Lewis without concerns at this time.     Ms. Lewis instructed to perform breast self-exams and notify physician of any changes/concerns. Suzan Lewis verbalized agreement.

## 2024-08-15 NOTE — TELEPHONE ENCOUNTER
1421: With the assistance of language line  Marley ID # [id number] attempted to contact Suzan Lewis post breast biopsy regarding pathology results and recommendations. General voice message left on both the home and mobile phone requesting return call. Contact information provided.

## 2024-09-13 RX ORDER — TAMOXIFEN CITRATE 20 MG/1
20 TABLET ORAL DAILY
Qty: 90 TABLET | Refills: 3 | Status: SHIPPED | OUTPATIENT
Start: 2024-09-13

## 2025-02-19 ENCOUNTER — OFFICE VISIT (OUTPATIENT)
Dept: FAMILY MEDICINE CLINIC | Facility: CLINIC | Age: 55
End: 2025-02-19
Payer: COMMERCIAL

## 2025-02-19 VITALS
RESPIRATION RATE: 18 BRPM | HEART RATE: 76 BPM | DIASTOLIC BLOOD PRESSURE: 52 MMHG | HEIGHT: 65.1 IN | BODY MASS INDEX: 20.39 KG/M2 | WEIGHT: 122.38 LBS | OXYGEN SATURATION: 97 % | TEMPERATURE: 98 F | SYSTOLIC BLOOD PRESSURE: 110 MMHG

## 2025-02-19 DIAGNOSIS — D05.11 DUCTAL CARCINOMA IN SITU (DCIS) OF RIGHT BREAST: ICD-10-CM

## 2025-02-19 DIAGNOSIS — Z13.21 SCREENING FOR ENDOCRINE, NUTRITIONAL, METABOLIC AND IMMUNITY DISORDER: ICD-10-CM

## 2025-02-19 DIAGNOSIS — Z13.6 SCREENING FOR ISCHEMIC HEART DISEASE: ICD-10-CM

## 2025-02-19 DIAGNOSIS — Z13.228 SCREENING FOR ENDOCRINE, NUTRITIONAL, METABOLIC AND IMMUNITY DISORDER: ICD-10-CM

## 2025-02-19 DIAGNOSIS — A04.8 H. PYLORI INFECTION: ICD-10-CM

## 2025-02-19 DIAGNOSIS — Z13.0 SCREENING FOR ENDOCRINE, NUTRITIONAL, METABOLIC AND IMMUNITY DISORDER: ICD-10-CM

## 2025-02-19 DIAGNOSIS — Z00.00 ANNUAL PHYSICAL EXAM: Primary | ICD-10-CM

## 2025-02-19 DIAGNOSIS — Z13.29 SCREENING FOR ENDOCRINE, NUTRITIONAL, METABOLIC AND IMMUNITY DISORDER: ICD-10-CM

## 2025-02-19 DIAGNOSIS — Z12.11 SCREEN FOR COLON CANCER: ICD-10-CM

## 2025-02-19 DIAGNOSIS — R10.2 PELVIC PAIN IN FEMALE: ICD-10-CM

## 2025-02-19 DIAGNOSIS — Z23 NEED FOR VACCINATION: ICD-10-CM

## 2025-02-19 PROCEDURE — 99396 PREV VISIT EST AGE 40-64: CPT | Performed by: FAMILY MEDICINE

## 2025-02-20 NOTE — PROGRESS NOTES
Chief Complaint   Patient presents with    Physical     Annual exam w/o pap        HPI:   Suzan Lewis is a 54 year old female who presents for a complete physical with gyne exam.   Patient feels well, dental visit up to date, no hearing problem.  Vaccinations : declines vaccines today    LMP: is on Tamoxifen  Sexual activity:monogamy   Contraception: not interested  Exercise:  active lifestyle .  Diet:  regular    Wt Readings from Last 3 Encounters:   25 122 lb 6.4 oz (55.5 kg)   24 123 lb (55.8 kg)   24 128 lb 12.8 oz (58.4 kg)      BP Readings from Last 3 Encounters:   25 110/52   24 138/73   24 92/47     No LMP recorded.     Annual physical:  Overall pt states she feels well.     LMP: on Tamoxifen  Sexually Active: monogamous  Last pap smear:2023, was normal pap and HPV neg (rpt in 5 yrs)  Last mammogram: 2024, defer to Oncology (h/o Breast Ca)  Last Colon Ca screening: agreeable to Cologuard  Last DEXA Scan: defer to oncology (is on Tamoxifen)    Exercise: active  Diet: regular    H. Pylori infection: when in [place] in , she was dx'd with H pylori infection, but never retested to see if she was cured after treatment.  Currently she feels well- has no abdominal pain. BM are normal.    Pelvic pain: she reports that while in [place] she complete US pelvis there, showing she had a fibroid? (Pt unsure of findings). She still gets intermittent pelvic pain          Current Outpatient Medications   Medication Sig Dispense Refill    TAMOXIFEN 20 MG Oral Tab TAKE 1 TABLET(20 MG) BY MOUTH DAILY 90 tablet 3      Past Medical History:    Allergic rhinitis    Back pain    Breast cancer (HCC)    IDC    Cancer (HCC)    Right breast cancer surgically removed and treated with radiation    Chronic sinusitis    Non-allergic rhinitis    Pneumonia      Past Surgical History:   Procedure Laterality Date    Colonoscopy  2019    Lumpectomy right  2023    lumpectomy in [place]      Aug. 8,  2001 and Nov. 12, 2003    Oral surgery  08/01/2016    Radiation right        Family History   Problem Relation Age of Onset    Breast Cancer Self     Depression Mother     Dementia Mother         alzheimer at 70 yr old    Dementia Maternal Grandmother 70      Social History:  Social History     Socioeconomic History    Marital status:    Tobacco Use    Smoking status: Never    Smokeless tobacco: Never   Vaping Use    Vaping status: Never Used   Substance and Sexual Activity    Alcohol use: No    Drug use: No   Other Topics Concern    Caffeine Concern No    Stress Concern No    Weight Concern No    Special Diet No    Exercise Yes     Comment: Play pinToodalu 5 times or more a week    Seat Belt Yes       Allergies:  Allergies[1]     REVIEW OF SYSTEMS:     Review of Systems   Constitutional:  Negative for appetite change and unexpected weight change.   Gastrointestinal:  Negative for abdominal pain, constipation, diarrhea, nausea and vomiting.   Genitourinary:  Negative for dysuria.        EXAM:   /52 (BP Location: Left arm, Patient Position: Sitting, Cuff Size: adult)   Pulse 76   Temp 97.9 °F (36.6 °C) (Temporal)   Resp 18   Ht 5' 5.1\" (1.654 m)   Wt 122 lb 6.4 oz (55.5 kg)   SpO2 97%   BMI 20.31 kg/m²    GENERAL: WD/WN in no acute distress.   HEENT: PERRLA and EOMI.  OP moist no lesions.TM WNL, tania.Normal ears canals bilaterally.  Neck is supple, with no cervical LAD or thyroid abnormalities.  LUNGS: are clear to auscultation bilaterally, with no wheeze, rhonchi, or rales.   HEART: is RRR.  S1, S2, with no murmurs,clicks, gallops  BREAST:deferred  ABDOMEN: is soft,NBS, NT/ND with no HSM.  No rebound or guarding. No CVA tenderness, no hernias.   EXAM: deferred  RECTAL EXAM: deferred  NEURO: Cranial nerves II-XII normal,no focal abnormalities, and reflexes coordination and gait normal and symmetric.Sensation intact.  EXTREMETIES: are symmetric with no cyanosis, clubbing, or edema.  MS: Normal  muscles tones, no joints abnormalities.  SKIN: Normal color, turgor, no lesions, rashes or wounds.  PSYCH: normal affect and mood.    ASSESSMENT AND PLAN:     54 year old female with     1. Annual physical exam  Routine labs ordered today, await results. Counseled pt on healthy lifestyle changes. Vaccines today: declines vaccines today . Contraception: not interested .     Last pap smear:2/2023, was normal pap and HPV neg (rpt in 5 yrs)  Last mammogram: 8/2024, defer to Oncology (h/o Breast Ca)  Last Colon Ca screening: agreeable to Cologuard  Last DEXA Scan: defer to oncology (is on Tamoxifen)    - CBC With Differential With Platelet  - Comp Metabolic Panel  - Lipid Panel  - TSH W Reflex To Free T4    2. Pelvic pain in female  - order US Pelvis, await results    - US PELVIS (TRANSABDOMINAL AND TRANSVAGINAL) (CPT=76856/15624); Future    3. Ductal carcinoma in situ (DCIS) of right breast  - due to see Oncololgy, Dr. Valadez  - is on Tamoxifen (will be on this for 5 yrs)    4. H. pylori infection  - pt requesting test of cure, was treated for this in 2019  - asymptomatic     - H PYLORI BREATH TEST [[id number]][Q]    5. Screen for colon cancer    - COLOGUARD COLON CANCER SCREENING (EXTERNAL)    6. Screening for ischemic heart disease    - Lipid Panel    7. Screening for endocrine, nutritional, metabolic and immunity disorder    - CBC With Differential With Platelet  - Comp Metabolic Panel  - TSH W Reflex To Free T4    8. Need for vaccination    - INFLUENZA REFUSED EE        Pt's was recommended low fat diet and aerobic exercise 30 minutes three times weekly.   Health maintenance.   Osteoporosis prevention addressed  Recommended whole food type diet, eliminate processed food/low sugar and low sat fat diet      The patient indicates understanding of these issues and agrees to the plan.    Return in about 1 year (around 2/19/2026) for annual physical.       [1]   Allergies  Allergen Reactions    Penicillins      Pt tested  positive to allergy

## 2025-02-21 LAB
ABSOLUTE BASOPHILS: 40 CELLS/UL (ref 0–200)
ABSOLUTE EOSINOPHILS: 40 CELLS/UL (ref 15–500)
ABSOLUTE LYMPHOCYTES: 1300 CELLS/UL (ref 850–3900)
ABSOLUTE MONOCYTES: 252 CELLS/UL (ref 200–950)
ABSOLUTE NEUTROPHILS: 2368 CELLS/UL (ref 1500–7800)
ALBUMIN/GLOBULIN RATIO: 1.7 (CALC) (ref 1–2.5)
ALBUMIN: 4.3 G/DL (ref 3.6–5.1)
ALKALINE PHOSPHATASE: 27 U/L (ref 37–153)
ALT: 11 U/L (ref 6–29)
AST: 17 U/L (ref 10–35)
BASOPHILS: 1 %
BILIRUBIN, TOTAL: 0.6 MG/DL (ref 0.2–1.2)
BUN: 14 MG/DL (ref 7–25)
CALCIUM: 9.3 MG/DL (ref 8.6–10.4)
CARBON DIOXIDE: 29 MMOL/L (ref 20–32)
CHLORIDE: 105 MMOL/L (ref 98–110)
CHOL/HDLC RATIO: 2.3 (CALC)
CHOLESTEROL, TOTAL: 166 MG/DL
CREATININE: 0.79 MG/DL (ref 0.5–1.03)
EGFR: 90 ML/MIN/1.73M2
EOSINOPHILS: 1 %
GLOBULIN: 2.5 G/DL (CALC) (ref 1.9–3.7)
GLUCOSE: 90 MG/DL (ref 65–99)
HDL CHOLESTEROL: 72 MG/DL
HEMATOCRIT: 38.8 % (ref 35–45)
HEMOGLOBIN: 12.4 G/DL (ref 11.7–15.5)
LDL-CHOLESTEROL: 74 MG/DL (CALC)
LYMPHOCYTES: 32.5 %
MCH: 31.2 PG (ref 27–33)
MCHC: 32 G/DL (ref 32–36)
MCV: 97.7 FL (ref 80–100)
MONOCYTES: 6.3 %
MPV: 11.1 FL (ref 7.5–12.5)
NEUTROPHILS: 59.2 %
NON-HDL CHOLESTEROL: 94 MG/DL (CALC)
PLATELET COUNT: 221 THOUSAND/UL (ref 140–400)
POTASSIUM: 4.3 MMOL/L (ref 3.5–5.3)
PROTEIN, TOTAL: 6.8 G/DL (ref 6.1–8.1)
RDW: 11.8 % (ref 11–15)
RED BLOOD CELL COUNT: 3.97 MILLION/UL (ref 3.8–5.1)
RESULT:: NOT DETECTED
SODIUM: 143 MMOL/L (ref 135–146)
TRIGLYCERIDES: 116 MG/DL
TSH W/REFLEX TO FT4: 0.94 MIU/L
WHITE BLOOD CELL COUNT: 4 THOUSAND/UL (ref 3.8–10.8)

## 2025-04-28 ENCOUNTER — OFFICE VISIT (OUTPATIENT)
Age: 55
End: 2025-04-28
Attending: INTERNAL MEDICINE
Payer: COMMERCIAL

## 2025-04-28 VITALS
SYSTOLIC BLOOD PRESSURE: 111 MMHG | DIASTOLIC BLOOD PRESSURE: 71 MMHG | OXYGEN SATURATION: 97 % | HEART RATE: 70 BPM | BODY MASS INDEX: 20.49 KG/M2 | WEIGHT: 123 LBS | HEIGHT: 65.12 IN | TEMPERATURE: 98 F | RESPIRATION RATE: 16 BRPM

## 2025-04-28 DIAGNOSIS — G89.29 CHRONIC RIGHT-SIDED LOW BACK PAIN WITHOUT SCIATICA: ICD-10-CM

## 2025-04-28 DIAGNOSIS — M25.551 RIGHT HIP PAIN: ICD-10-CM

## 2025-04-28 DIAGNOSIS — Z17.0 MALIGNANT NEOPLASM OF UPPER-OUTER QUADRANT OF RIGHT BREAST IN FEMALE, ESTROGEN RECEPTOR POSITIVE (HCC): Primary | ICD-10-CM

## 2025-04-28 DIAGNOSIS — M54.50 CHRONIC RIGHT-SIDED LOW BACK PAIN WITHOUT SCIATICA: ICD-10-CM

## 2025-04-28 DIAGNOSIS — C50.411 MALIGNANT NEOPLASM OF UPPER-OUTER QUADRANT OF RIGHT BREAST IN FEMALE, ESTROGEN RECEPTOR POSITIVE (HCC): Primary | ICD-10-CM

## 2025-04-28 NOTE — PROGRESS NOTES
Cancer Center Progress Note    Problem List:      Problem List[1]      History of Present Illness  Suzan Lewis is a 54 year old female with right breast cancer who presents for follow-up of her condition and associated symptoms.    She has a history of invasive ductal carcinoma in the upper outer quadrants of the right breast. A lumpectomy in July 2023 revealed a 2 cm cancer with three negative lymph nodes. Her oncotype DX recurrence score was very low. She began tamoxifen in October 2023 and has been taking it daily since. In August 2024, a right breast biopsy following a mammogram that showed calcifications was benign.    In the last few months, she has experienced intermittent muscle pain in her back, which sometimes becomes severe enough to impair her ability to walk. This pain began around November 2024 and varies in intensity, with some days being worse than others. She uses Voltaren for relief, which has been effective. The pain sometimes radiates to her groin and down her leg, occasionally affecting her knee, especially when using stairs. She also experiences tightness in her back muscles and occasional neck pain. No arm pain, rib pain, or swelling in her legs.    In March 2024, an MRI of her abdomen revealed a lobulated cystic lesion in the pancreatic head, which was biopsied and showed no worrisome features. A follow-up MRI in March 2025 showed a 2 cm lesion in the pancreatic head with no enhancement and no enlarged lymph nodes. She reports normal appetite, no unexplained weight loss, and normal bowel movements. Her last menstrual period was in February 2025, lasting one day. She does not experience hot flashes and has not had a Pap smear this year.    Her last blood work in February 2025 showed normal CBC, chemistry panel, cholesterol panel, and thyroid levels. An H. pylori breath test was negative.      Review of Systems:   Constitutional: Negative for anorexia, fatigue, fevers, chills, night sweats and  weight loss.  Eyes: Negative for visual disturbance, irritation and redness.  Respiratory: Negative for cough, hemoptysis, chest pain, or dyspnea.  Cardiovascular: Negative for angina, orthopnea or palpitations.  Gastrointestinal: Negative for nausea, vomiting, change in bowel habits, diarrhea, constipation and abdominal pain.  Integument/breast: Negative for rash, skin lesions, and pruritus.  Hematologic/lymphatic: Negative for easy bruising, bleeding, and lymphadenopathy.  Genitourinary: Negative for dysuria or hematuria  Neurological: Negative for headaches, dizziness, speech problems, gait problems and focal weakness.  Psychiatric: The patient's mood was calm and appropriate for this visit.  The pertinent positives and negatives were described. All other systems were negative.    PMH/PSH:  Past Medical History[2]    Past Surgical History[3]    Family History Reviewed:  Family History[4]    Allergies:     Allergies[5]    Medications:  Current Medications[6]       Vital Signs:      Height: 165.4 cm (5' 5.12\") (04/28 1544)  Weight: 55.8 kg (123 lb) (04/28 1544)  BSA (Calculated - sq m): 1.61 sq meters (04/28 1544)  Pulse: 70 (04/28 1544)  BP: 111/71 (04/28 1544)  Temp: 98.1 °F (36.7 °C) (04/28 1544)  Do Not Use - Resp Rate: --  SpO2: 97 % (04/28 1544)      Performance Status:  ECOG 0: Fully active, able to carry on all pre-disease performance without restriction     Physical Examination:      Constitutional: Patient is alert and oriented x 3, not in acute distress.  Eyes: Anicteric sclera, pink conjunctiva.  HEENT:  Oropharynx is clear. Neck is supple.  Respiratory: Clear to auscultation and percussion. No rales.  No wheezes.  Cardiovascular: Regular rate and rhythm. No murmurs.  Gastrointestinal: Soft, non tender with good bowel sounds.  Musculoskeletal: No edema. No calf tenderness.  Neurological: Grossly intact without focal motor or sensory deficit.  Skin: No suspicious skin lesion, no rash, no  ulceration.  Lymphatics: There is no palpable lymphadenopathy throughout in the cervical, supraclavicular, or axillary regions.  Psychiatric: The patient's mood is calm and appropriate for this visit.           Results reviewed at this visit:     Lab Results   Component Value Date    WBC 4.0 02/20/2025    RBC 3.97 02/20/2025    HGB 12.4 02/20/2025    HCT 38.8 02/20/2025    MCV 97.7 02/20/2025    MCH 31.2 02/20/2025    MCHC 32.0 02/20/2025    RDW 11.8 02/20/2025     02/20/2025     Lab Results   Component Value Date     02/20/2025    K 4.3 02/20/2025     02/20/2025    CO2 29 02/20/2025    BUN 14 02/20/2025    CREATSERUM 0.79 02/20/2025    GLU 90 02/20/2025    CA 9.3 02/20/2025    ALKPHO 27 (L) 02/20/2025    ALT 11 02/20/2025    AST 17 02/20/2025    BILT 0.6 02/20/2025    ALB 4.3 02/20/2025    TP 6.8 02/20/2025       Results  LABS  CBC: normal (02/2025)  Chemistry panel: normal (02/2025)  Cholesterol panel: normal (02/2025)  Thyroid level: normal (02/2025)  H. pylori breath test: negative (02/2025)    RADIOLOGY  Abdominal MRI: lobulated cystic lesion involving the pancreatic head, may represent mucinous cystadenoma (03/2024)  Abdominal MRI and MRCP: 2 cm lesion in pancreatic head, no enhancement, liver, spleen, adrenal glands, kidneys all normal, common bile duct normal, no enlarged lymph nodes, cystic lesion of the pancreatic head (03/11/2025)    DIAGNOSTIC  EUS: no evidence of intramural nodule, biopsy did not show any worrisome features (03/2024)    PATHOLOGY  Right breast biopsy: benign (08/2024)  Pancreatic head biopsy: acellular debris, no cancer (03/2024)         Assessment & Plan  Invasive ductal carcinoma of the upper outer right breast:  Lumpectomy on 7/5/2023 in [place]  Tumor size up to 2 cm  SLN 0/3  T1N0  Oncotype Dx RS 6  Tamoxifen started in 10/2023    Invasive ductal carcinoma of the upper outer quadrant of the right breast, estrogen receptor positive. Status post lumpectomy in July  2023 with negative lymph nodes and low Oncotype DX recurrence score. Currently on tamoxifen since October 2023. No evidence of recurrence on recent imaging. Due for right breast mammogram and ultrasound as per surveillance protocol.  - Order right breast diagnostic mammogram and ultrasound  - Schedule bilateral mammogram in six months  - Recommend yearly pelvic exam and Pap smear while on tamoxifen  - Recommend yearly eye exam while on tamoxifen    Muscle pain and tightness  Intermittent muscle pain and tightness in the back, sometimes severe, with onset approximately six months ago. Pain is relieved by Voltaren. Differential includes arthritis, disc disease, or muscle spasm. Unlikely related to tamoxifen. Bone scan considered to rule out metastatic disease, but deferred based on shared decision making. Cancer in the bone is not early and is not curable, so there is no harm in delaying the scan. If symptoms worsen, further evaluation will be pursued.  - Recommend over-the-counter anti-inflammatory medication as needed for pain  - Consider bone scan if symptoms worsen or persist  - Discuss potential referral to physical therapy or spine specialist if bone scan is negative and symptoms persist    Cystic lesion of pancreatic head  Lobulated cystic lesion in the pancreatic head, identified on MRI. Biopsy showed no worrisome features, and EUS showed no evidence of intramural nodule. Considered low risk and under observation with repeat MRI in one year.           The following individual(s) verbally consented to be recorded using ambient AI listening technology and understand that they can each withdraw their consent to this listening technology at any point by asking the clinician to turn off or pause the recording:    Patient name: Suzan Lewis  Additional names:  Jhonathan Lewis          Dale Valadez MD          [1]   Patient Active Problem List  Diagnosis    Non-allergic rhinitis    Patellofemoral arthritis of right knee     Perioral dermatitis    Malignant neoplasm of upper-outer quadrant of right breast in female, estrogen receptor positive (HCC)   [2]   Past Medical History:   Allergic rhinitis    Back pain    Breast cancer (HCC)    IDC    Cancer (HCC)    Right breast cancer surgically removed and treated with radiation    Chronic sinusitis    Non-allergic rhinitis    Pneumonia   [3]   Past Surgical History:  Procedure Laterality Date    Colonoscopy  2019    Lumpectomy right  2023    lumpectomy in [place]      Aug. 8, 2001 and 2003    Oral surgery  2016    Radiation right     [4]   Family History  Problem Relation Age of Onset    Breast Cancer Self     Depression Mother     Dementia Mother         alzheimer at 70 yr old    Dementia Maternal Grandmother 70   [5]   Allergies  Allergen Reactions    Penicillins      Pt tested positive to allergy   [6]    TAMOXIFEN 20 MG Oral Tab TAKE 1 TABLET(20 MG) BY MOUTH DAILY 90 tablet 3

## 2025-04-28 NOTE — PROGRESS NOTES
Patient here for 1 year f/u for h/o breast cancer. Patient taking tamoxifen as directed with c/o lower back pain and insomnia. Patient has been taking tamoxifen since 2023. Patient is accompanied by her .     Education Record    Learner:  Patient and Spouse    Disease / Diagnosis: breast cancer     Barriers / Limitations:  None   Comments:    Method:  Discussion   Comments:    General Topics:  Medication and Plan of care reviewed   Comments:    Outcome:  Shows understanding   Comments:

## 2025-05-02 ENCOUNTER — TELEPHONE (OUTPATIENT)
Dept: FAMILY MEDICINE CLINIC | Facility: CLINIC | Age: 55
End: 2025-05-02

## 2025-05-02 NOTE — TELEPHONE ENCOUNTER
Please contact pt and inform of NEGATIVE Cologuard result.      Repeat testing in 3 years.    Thanks.

## (undated) NOTE — MR AVS SNAPSHOT
[address] [address] [phone]               Thank you for choosing us for your health care visit with Bernardo James DO.   We are glad to serve you and happy to provide you with th 8. Hold for 30 to 60 seconds. Repeat 2 times, or as instructed. 9. Switch legs and repeat. 10. Repeat this exercise 3 times per day, or as instructed.     Tip: Don’t bounce while you’re stretching.    Date Last Reviewed: 3/10/2016  © 3428-7559 [employer]

## (undated) NOTE — MR AVS SNAPSHOT
[address] [address] [phone]               Thank you for choosing us for your health care visit with Ricardo Mcgrath DO.   We are glad to serve you and happy to provide you with th clinic staff will provide you with the phone number you should call to schedule your appointment.      If you are confident that your benefit plan will not require a referral or authorization, such as [place], please feel free to schedule your jairo Visit [facility] online at  [email or web address]